# Patient Record
Sex: FEMALE | Race: WHITE | Employment: FULL TIME | URBAN - METROPOLITAN AREA
[De-identification: names, ages, dates, MRNs, and addresses within clinical notes are randomized per-mention and may not be internally consistent; named-entity substitution may affect disease eponyms.]

---

## 2020-04-14 LAB
HBSAG, EXTERNAL: NEGATIVE
HIV, EXTERNAL: NON REACTIVE
RUBELLA, EXTERNAL: NORMAL
TYPE, ABO & RH, EXTERNAL: NORMAL

## 2020-10-16 LAB — GRBS, EXTERNAL: POSITIVE

## 2020-11-14 LAB — T. PALLIDUM, EXTERNAL: NON REACTIVE

## 2020-11-17 ENCOUNTER — TRANSCRIBE ORDER (OUTPATIENT)
Dept: REGISTRATION | Age: 35
End: 2020-11-17

## 2020-11-17 ENCOUNTER — HOSPITAL ENCOUNTER (OUTPATIENT)
Dept: PREADMISSION TESTING | Age: 35
Discharge: HOME | End: 2020-11-17
Payer: COMMERCIAL

## 2020-11-17 DIAGNOSIS — Z01.812 PRE-PROCEDURE LAB EXAM: Primary | ICD-10-CM

## 2020-11-17 DIAGNOSIS — Z01.812 PRE-PROCEDURE LAB EXAM: ICD-10-CM

## 2020-11-17 PROCEDURE — 87635 SARS-COV-2 COVID-19 AMP PRB: CPT

## 2020-11-18 LAB — SARS-COV-2, COV2NT: NOT DETECTED

## 2020-11-19 ENCOUNTER — HOSPITAL ENCOUNTER (INPATIENT)
Age: 35
LOS: 3 days | Discharge: HOME | End: 2020-11-22
Attending: OBSTETRICS & GYNECOLOGY | Admitting: OBSTETRICS & GYNECOLOGY
Payer: COMMERCIAL

## 2020-11-19 DIAGNOSIS — Z98.891 STATUS POST CESAREAN DELIVERY: Primary | ICD-10-CM

## 2020-11-19 PROBLEM — Z3A.40 40 WEEKS GESTATION OF PREGNANCY: Status: ACTIVE | Noted: 2020-11-19

## 2020-11-19 LAB
ERYTHROCYTE [DISTWIDTH] IN BLOOD BY AUTOMATED COUNT: 12.3 % (ref 11.5–14.5)
HCT VFR BLD AUTO: 38.2 % (ref 35–47)
HGB BLD-MCNC: 13.4 G/DL (ref 11.5–16)
MCH RBC QN AUTO: 32.9 PG (ref 26–34)
MCHC RBC AUTO-ENTMCNC: 35.1 G/DL (ref 30–36.5)
MCV RBC AUTO: 93.9 FL (ref 80–99)
NRBC # BLD: 0 K/UL (ref 0–0.01)
NRBC BLD-RTO: 0 PER 100 WBC
PLATELET # BLD AUTO: 215 K/UL (ref 150–400)
PMV BLD AUTO: 11.5 FL (ref 8.9–12.9)
RBC # BLD AUTO: 4.07 M/UL (ref 3.8–5.2)
WBC # BLD AUTO: 7.7 K/UL (ref 3.6–11)

## 2020-11-19 PROCEDURE — 85027 COMPLETE CBC AUTOMATED: CPT

## 2020-11-19 PROCEDURE — 65270000029 HC RM PRIVATE

## 2020-11-19 PROCEDURE — 74011250637 HC RX REV CODE- 250/637: Performed by: OBSTETRICS & GYNECOLOGY

## 2020-11-19 PROCEDURE — 75410000002 HC LABOR FEE PER 1 HR

## 2020-11-19 PROCEDURE — 36415 COLL VENOUS BLD VENIPUNCTURE: CPT

## 2020-11-19 PROCEDURE — 74011250636 HC RX REV CODE- 250/636: Performed by: OBSTETRICS & GYNECOLOGY

## 2020-11-19 PROCEDURE — 3E0P7VZ INTRODUCTION OF HORMONE INTO FEMALE REPRODUCTIVE, VIA NATURAL OR ARTIFICIAL OPENING: ICD-10-PCS | Performed by: OBSTETRICS & GYNECOLOGY

## 2020-11-19 PROCEDURE — 74011000258 HC RX REV CODE- 258: Performed by: OBSTETRICS & GYNECOLOGY

## 2020-11-19 RX ORDER — FENTANYL CITRATE 50 UG/ML
100 INJECTION, SOLUTION INTRAMUSCULAR; INTRAVENOUS
Status: DISCONTINUED | OUTPATIENT
Start: 2020-11-19 | End: 2020-11-20

## 2020-11-19 RX ORDER — SODIUM CHLORIDE 0.9 % (FLUSH) 0.9 %
5-40 SYRINGE (ML) INJECTION AS NEEDED
Status: DISCONTINUED | OUTPATIENT
Start: 2020-11-19 | End: 2020-11-20

## 2020-11-19 RX ORDER — ACETAMINOPHEN 325 MG/1
650 TABLET ORAL
Status: DISCONTINUED | OUTPATIENT
Start: 2020-11-19 | End: 2020-11-20

## 2020-11-19 RX ORDER — ZOLPIDEM TARTRATE 5 MG/1
5 TABLET ORAL
Status: DISCONTINUED | OUTPATIENT
Start: 2020-11-19 | End: 2020-11-20

## 2020-11-19 RX ORDER — BUTORPHANOL TARTRATE 1 MG/ML
1 INJECTION INTRAMUSCULAR; INTRAVENOUS
Status: DISCONTINUED | OUTPATIENT
Start: 2020-11-19 | End: 2020-11-20

## 2020-11-19 RX ORDER — SODIUM CHLORIDE 0.9 % (FLUSH) 0.9 %
5-40 SYRINGE (ML) INJECTION EVERY 8 HOURS
Status: DISCONTINUED | OUTPATIENT
Start: 2020-11-19 | End: 2020-11-20

## 2020-11-19 RX ADMIN — AMPICILLIN SODIUM 2 G: 2 INJECTION, POWDER, FOR SOLUTION INTRAMUSCULAR; INTRAVENOUS at 22:18

## 2020-11-19 RX ADMIN — ZOLPIDEM TARTRATE 5 MG: 5 TABLET ORAL at 23:02

## 2020-11-19 NOTE — PROGRESS NOTES
Asked by Dr. Daphne Delgado to place a [place] balloon for this G1 induction @ 40 2/7 wks. Patient is doing well with no complaints this evening. She reported having no questions for me having reviewed the process with Dr. Daphne Delgado in the office. BSUS VTX  FHT baseline 140, moderate variability, accels present, decels absent  TOCO q 3-6 minutes irregularly  Cx 2/90/-1  Cook placed with cervical exam 60/40; tolerated well by patient. Dr. Daphne Delgado to inport H&P and give RN instructions regarding timing of pitocin or cytotec.

## 2020-11-19 NOTE — PROGRESS NOTES
1600-Pt arrived to labor and delivery room 6 for induction of labor. [facility] 11/17/20. Pt denies any complications during pregnancy. Gown given. 1748-Dr Sandra Bhatti at bedside. Bedside ultrasound done and confirmed vertex presentation. Cervical ripening balloon placed without difficulties. 60ml in uterine balloon and 40 ml in vaginal balloon. Pt tolerated procedure well. Dr David Gongora to call and give orders regarding cytotec or pitocin. 1935-Verbal shift change report given to BIPIN Becerra RN (oncoming nurse) by SRINIVAS Sigala RN  (offgoing nurse). Report included the following information SBAR.

## 2020-11-20 ENCOUNTER — ANESTHESIA EVENT (OUTPATIENT)
Dept: LABOR AND DELIVERY | Age: 35
End: 2020-11-20
Payer: COMMERCIAL

## 2020-11-20 ENCOUNTER — ANESTHESIA (OUTPATIENT)
Dept: LABOR AND DELIVERY | Age: 35
End: 2020-11-20
Payer: COMMERCIAL

## 2020-11-20 PROCEDURE — 65410000002 HC RM PRIVATE OB

## 2020-11-20 PROCEDURE — 77030014125 HC TY EPDRL BBMI -B: Performed by: ANESTHESIOLOGY

## 2020-11-20 PROCEDURE — 77030019905 HC CATH URETH INTMIT MDII -A

## 2020-11-20 PROCEDURE — 74011250636 HC RX REV CODE- 250/636: Performed by: OBSTETRICS & GYNECOLOGY

## 2020-11-20 PROCEDURE — 76010000391 HC C SECN FIRST 1 HR: Performed by: OBSTETRICS & GYNECOLOGY

## 2020-11-20 PROCEDURE — 74011250636 HC RX REV CODE- 250/636: Performed by: NURSE ANESTHETIST, CERTIFIED REGISTERED

## 2020-11-20 PROCEDURE — 76060000078 HC EPIDURAL ANESTHESIA

## 2020-11-20 PROCEDURE — 74011250636 HC RX REV CODE- 250/636: Performed by: ANESTHESIOLOGY

## 2020-11-20 PROCEDURE — 77030040830 HC CATH URETH FOL MDII -A

## 2020-11-20 PROCEDURE — 74011000250 HC RX REV CODE- 250: Performed by: NURSE PRACTITIONER

## 2020-11-20 PROCEDURE — 77030012890

## 2020-11-20 PROCEDURE — 75410000003 HC RECOV DEL/VAG/CSECN EA 0.5 HR: Performed by: OBSTETRICS & GYNECOLOGY

## 2020-11-20 PROCEDURE — 74011000250 HC RX REV CODE- 250: Performed by: NURSE ANESTHETIST, CERTIFIED REGISTERED

## 2020-11-20 PROCEDURE — 74011000250 HC RX REV CODE- 250: Performed by: ANESTHESIOLOGY

## 2020-11-20 PROCEDURE — 74011250636 HC RX REV CODE- 250/636: Performed by: NURSE PRACTITIONER

## 2020-11-20 PROCEDURE — 74011000258 HC RX REV CODE- 258: Performed by: OBSTETRICS & GYNECOLOGY

## 2020-11-20 PROCEDURE — 76060000078 HC EPIDURAL ANESTHESIA: Performed by: OBSTETRICS & GYNECOLOGY

## 2020-11-20 PROCEDURE — A4300 CATH IMPL VASC ACCESS PORTAL: HCPCS

## 2020-11-20 PROCEDURE — 75410000002 HC LABOR FEE PER 1 HR

## 2020-11-20 RX ORDER — CEFAZOLIN SODIUM 1 G/3ML
INJECTION, POWDER, FOR SOLUTION INTRAMUSCULAR; INTRAVENOUS AS NEEDED
Status: DISCONTINUED | OUTPATIENT
Start: 2020-11-20 | End: 2020-11-20 | Stop reason: HOSPADM

## 2020-11-20 RX ORDER — OXYCODONE AND ACETAMINOPHEN 5; 325 MG/1; MG/1
1 TABLET ORAL
Status: DISCONTINUED | OUTPATIENT
Start: 2020-11-20 | End: 2020-11-22 | Stop reason: HOSPADM

## 2020-11-20 RX ORDER — SODIUM CHLORIDE, SODIUM LACTATE, POTASSIUM CHLORIDE, CALCIUM CHLORIDE 600; 310; 30; 20 MG/100ML; MG/100ML; MG/100ML; MG/100ML
125 INJECTION, SOLUTION INTRAVENOUS CONTINUOUS
Status: DISCONTINUED | OUTPATIENT
Start: 2020-11-20 | End: 2020-11-22 | Stop reason: HOSPADM

## 2020-11-20 RX ORDER — SODIUM CHLORIDE 0.9 % (FLUSH) 0.9 %
5-40 SYRINGE (ML) INJECTION EVERY 8 HOURS
Status: DISCONTINUED | OUTPATIENT
Start: 2020-11-20 | End: 2020-11-22 | Stop reason: HOSPADM

## 2020-11-20 RX ORDER — ONDANSETRON 2 MG/ML
INJECTION INTRAMUSCULAR; INTRAVENOUS AS NEEDED
Status: DISCONTINUED | OUTPATIENT
Start: 2020-11-20 | End: 2020-11-20 | Stop reason: HOSPADM

## 2020-11-20 RX ORDER — ONDANSETRON 2 MG/ML
4 INJECTION INTRAMUSCULAR; INTRAVENOUS
Status: DISCONTINUED | OUTPATIENT
Start: 2020-11-20 | End: 2020-11-20 | Stop reason: SDUPTHER

## 2020-11-20 RX ORDER — LIDOCAINE HYDROCHLORIDE AND EPINEPHRINE 20; 5 MG/ML; UG/ML
INJECTION, SOLUTION EPIDURAL; INFILTRATION; INTRACAUDAL; PERINEURAL
Status: COMPLETED
Start: 2020-11-20 | End: 2020-11-20

## 2020-11-20 RX ORDER — FENTANYL CITRATE 50 UG/ML
100 INJECTION, SOLUTION INTRAMUSCULAR; INTRAVENOUS ONCE
Status: DISCONTINUED | OUTPATIENT
Start: 2020-11-20 | End: 2020-11-20 | Stop reason: HOSPADM

## 2020-11-20 RX ORDER — OXYTOCIN/RINGER'S LACTATE 30/500 ML
1-25 PLASTIC BAG, INJECTION (ML) INTRAVENOUS
Status: DISCONTINUED | OUTPATIENT
Start: 2020-11-20 | End: 2020-11-20 | Stop reason: HOSPADM

## 2020-11-20 RX ORDER — NALOXONE HYDROCHLORIDE 0.4 MG/ML
0.4 INJECTION, SOLUTION INTRAMUSCULAR; INTRAVENOUS; SUBCUTANEOUS AS NEEDED
Status: DISCONTINUED | OUTPATIENT
Start: 2020-11-20 | End: 2020-11-22 | Stop reason: HOSPADM

## 2020-11-20 RX ORDER — IBUPROFEN 400 MG/1
800 TABLET ORAL EVERY 8 HOURS
Status: DISCONTINUED | OUTPATIENT
Start: 2020-11-20 | End: 2020-11-22 | Stop reason: HOSPADM

## 2020-11-20 RX ORDER — SODIUM CHLORIDE, SODIUM LACTATE, POTASSIUM CHLORIDE, CALCIUM CHLORIDE 600; 310; 30; 20 MG/100ML; MG/100ML; MG/100ML; MG/100ML
INJECTION, SOLUTION INTRAVENOUS
Status: DISCONTINUED | OUTPATIENT
Start: 2020-11-20 | End: 2020-11-20 | Stop reason: HOSPADM

## 2020-11-20 RX ORDER — FENTANYL CITRATE 50 UG/ML
INJECTION, SOLUTION INTRAMUSCULAR; INTRAVENOUS
Status: DISCONTINUED
Start: 2020-11-20 | End: 2020-11-20

## 2020-11-20 RX ORDER — KETOROLAC TROMETHAMINE 30 MG/ML
30 INJECTION, SOLUTION INTRAMUSCULAR; INTRAVENOUS
Status: DISPENSED | OUTPATIENT
Start: 2020-11-20 | End: 2020-11-21

## 2020-11-20 RX ORDER — DIPHENHYDRAMINE HYDROCHLORIDE 50 MG/ML
12.5 INJECTION, SOLUTION INTRAMUSCULAR; INTRAVENOUS
Status: DISCONTINUED | OUTPATIENT
Start: 2020-11-20 | End: 2020-11-20 | Stop reason: HOSPADM

## 2020-11-20 RX ORDER — OXYCODONE AND ACETAMINOPHEN 5; 325 MG/1; MG/1
2 TABLET ORAL
Status: DISCONTINUED | OUTPATIENT
Start: 2020-11-20 | End: 2020-11-22 | Stop reason: HOSPADM

## 2020-11-20 RX ORDER — DOCUSATE SODIUM 100 MG/1
100 CAPSULE, LIQUID FILLED ORAL
Status: DISCONTINUED | OUTPATIENT
Start: 2020-11-20 | End: 2020-11-22 | Stop reason: HOSPADM

## 2020-11-20 RX ORDER — OXYTOCIN/RINGER'S LACTATE 30/500 ML
10 PLASTIC BAG, INJECTION (ML) INTRAVENOUS AS NEEDED
Status: DISCONTINUED | OUTPATIENT
Start: 2020-11-20 | End: 2020-11-22 | Stop reason: HOSPADM

## 2020-11-20 RX ORDER — BUPIVACAINE HYDROCHLORIDE 2.5 MG/ML
INJECTION, SOLUTION EPIDURAL; INFILTRATION; INTRACAUDAL
Status: DISCONTINUED
Start: 2020-11-20 | End: 2020-11-20

## 2020-11-20 RX ORDER — DEXMEDETOMIDINE HYDROCHLORIDE 100 UG/ML
INJECTION, SOLUTION INTRAVENOUS AS NEEDED
Status: DISCONTINUED | OUTPATIENT
Start: 2020-11-20 | End: 2020-11-20 | Stop reason: HOSPADM

## 2020-11-20 RX ORDER — OXYTOCIN/RINGER'S LACTATE 30/500 ML
10 PLASTIC BAG, INJECTION (ML) INTRAVENOUS AS NEEDED
Status: DISCONTINUED | OUTPATIENT
Start: 2020-11-20 | End: 2020-11-20 | Stop reason: HOSPADM

## 2020-11-20 RX ORDER — BUPIVACAINE HYDROCHLORIDE 2.5 MG/ML
10 INJECTION, SOLUTION EPIDURAL; INFILTRATION; INTRACAUDAL ONCE
Status: DISCONTINUED | OUTPATIENT
Start: 2020-11-20 | End: 2020-11-20

## 2020-11-20 RX ORDER — SIMETHICONE 80 MG
80 TABLET,CHEWABLE ORAL
Status: DISCONTINUED | OUTPATIENT
Start: 2020-11-20 | End: 2020-11-22 | Stop reason: HOSPADM

## 2020-11-20 RX ORDER — MORPHINE SULFATE 10 MG/ML
6 INJECTION, SOLUTION INTRAMUSCULAR; INTRAVENOUS
Status: ACTIVE | OUTPATIENT
Start: 2020-11-20 | End: 2020-11-21

## 2020-11-20 RX ORDER — OXYTOCIN/RINGER'S LACTATE 30/500 ML
0-20 PLASTIC BAG, INJECTION (ML) INTRAVENOUS
Status: DISCONTINUED | OUTPATIENT
Start: 2020-11-20 | End: 2020-11-20

## 2020-11-20 RX ORDER — OXYTOCIN/RINGER'S LACTATE 20/1000 ML
PLASTIC BAG, INJECTION (ML) INTRAVENOUS
Status: DISCONTINUED | OUTPATIENT
Start: 2020-11-20 | End: 2020-11-20 | Stop reason: HOSPADM

## 2020-11-20 RX ORDER — SODIUM CHLORIDE, SODIUM LACTATE, POTASSIUM CHLORIDE, CALCIUM CHLORIDE 600; 310; 30; 20 MG/100ML; MG/100ML; MG/100ML; MG/100ML
125 INJECTION, SOLUTION INTRAVENOUS CONTINUOUS
Status: DISCONTINUED | OUTPATIENT
Start: 2020-11-20 | End: 2020-11-20 | Stop reason: HOSPADM

## 2020-11-20 RX ORDER — AMMONIA 15 % (W/V)
1 AMPUL (EA) INHALATION AS NEEDED
Status: DISCONTINUED | OUTPATIENT
Start: 2020-11-20 | End: 2020-11-22 | Stop reason: HOSPADM

## 2020-11-20 RX ORDER — NALOXONE HYDROCHLORIDE 0.4 MG/ML
0.4 INJECTION, SOLUTION INTRAMUSCULAR; INTRAVENOUS; SUBCUTANEOUS AS NEEDED
Status: DISCONTINUED | OUTPATIENT
Start: 2020-11-20 | End: 2020-11-20 | Stop reason: HOSPADM

## 2020-11-20 RX ORDER — FENTANYL/BUPIVACAINE/NS/PF 2-1250MCG
1-16 PREFILLED PUMP RESERVOIR EPIDURAL CONTINUOUS
Status: DISCONTINUED | OUTPATIENT
Start: 2020-11-20 | End: 2020-11-20

## 2020-11-20 RX ORDER — MORPHINE SULFATE 10 MG/ML
10 INJECTION, SOLUTION INTRAMUSCULAR; INTRAVENOUS
Status: ACTIVE | OUTPATIENT
Start: 2020-11-20 | End: 2020-11-21

## 2020-11-20 RX ORDER — ACETAMINOPHEN 325 MG/1
650 TABLET ORAL
Status: DISCONTINUED | OUTPATIENT
Start: 2020-11-20 | End: 2020-11-22 | Stop reason: HOSPADM

## 2020-11-20 RX ORDER — MORPHINE SULFATE 0.5 MG/ML
INJECTION, SOLUTION EPIDURAL; INTRATHECAL; INTRAVENOUS AS NEEDED
Status: DISCONTINUED | OUTPATIENT
Start: 2020-11-20 | End: 2020-11-20 | Stop reason: HOSPADM

## 2020-11-20 RX ORDER — DIPHENHYDRAMINE HYDROCHLORIDE 50 MG/ML
12.5 INJECTION, SOLUTION INTRAMUSCULAR; INTRAVENOUS
Status: DISCONTINUED | OUTPATIENT
Start: 2020-11-20 | End: 2020-11-22 | Stop reason: HOSPADM

## 2020-11-20 RX ORDER — OXYTOCIN/RINGER'S LACTATE 30/500 ML
95 PLASTIC BAG, INJECTION (ML) INTRAVENOUS AS NEEDED
Status: DISCONTINUED | OUTPATIENT
Start: 2020-11-20 | End: 2020-11-22 | Stop reason: HOSPADM

## 2020-11-20 RX ORDER — OXYTOCIN/RINGER'S LACTATE 30/500 ML
95 PLASTIC BAG, INJECTION (ML) INTRAVENOUS AS NEEDED
Status: DISCONTINUED | OUTPATIENT
Start: 2020-11-20 | End: 2020-11-20 | Stop reason: HOSPADM

## 2020-11-20 RX ORDER — CEFAZOLIN SODIUM/WATER 2 G/20 ML
SYRINGE (ML) INTRAVENOUS
Status: DISCONTINUED
Start: 2020-11-20 | End: 2020-11-20

## 2020-11-20 RX ORDER — FENTANYL CITRATE 50 UG/ML
INJECTION, SOLUTION INTRAMUSCULAR; INTRAVENOUS AS NEEDED
Status: DISCONTINUED | OUTPATIENT
Start: 2020-11-20 | End: 2020-11-20 | Stop reason: HOSPADM

## 2020-11-20 RX ORDER — CEFAZOLIN SODIUM/WATER 2 G/20 ML
2 SYRINGE (ML) INTRAVENOUS ONCE
Status: DISCONTINUED | OUTPATIENT
Start: 2020-11-20 | End: 2020-11-20 | Stop reason: HOSPADM

## 2020-11-20 RX ORDER — BUPIVACAINE HYDROCHLORIDE 2.5 MG/ML
INJECTION, SOLUTION EPIDURAL; INFILTRATION; INTRACAUDAL AS NEEDED
Status: DISCONTINUED | OUTPATIENT
Start: 2020-11-20 | End: 2020-11-20 | Stop reason: HOSPADM

## 2020-11-20 RX ORDER — HYDROCORTISONE 1 %
CREAM (GRAM) TOPICAL AS NEEDED
Status: DISCONTINUED | OUTPATIENT
Start: 2020-11-20 | End: 2020-11-22 | Stop reason: HOSPADM

## 2020-11-20 RX ORDER — ONDANSETRON 2 MG/ML
4 INJECTION INTRAMUSCULAR; INTRAVENOUS
Status: DISCONTINUED | OUTPATIENT
Start: 2020-11-20 | End: 2020-11-22 | Stop reason: HOSPADM

## 2020-11-20 RX ORDER — EPHEDRINE SULFATE/0.9% NACL/PF 50 MG/5 ML
10 SYRINGE (ML) INTRAVENOUS
Status: DISCONTINUED | OUTPATIENT
Start: 2020-11-20 | End: 2020-11-20 | Stop reason: HOSPADM

## 2020-11-20 RX ORDER — LIDOCAINE HYDROCHLORIDE AND EPINEPHRINE 20; 5 MG/ML; UG/ML
INJECTION, SOLUTION EPIDURAL; INFILTRATION; INTRACAUDAL; PERINEURAL AS NEEDED
Status: DISCONTINUED | OUTPATIENT
Start: 2020-11-20 | End: 2020-11-20 | Stop reason: HOSPADM

## 2020-11-20 RX ORDER — SODIUM CHLORIDE 0.9 % (FLUSH) 0.9 %
5-40 SYRINGE (ML) INJECTION AS NEEDED
Status: DISCONTINUED | OUTPATIENT
Start: 2020-11-20 | End: 2020-11-22 | Stop reason: HOSPADM

## 2020-11-20 RX ADMIN — MORPHINE SULFATE 2.5 MG: 0.5 INJECTION, SOLUTION EPIDURAL; INTRATHECAL; INTRAVENOUS at 18:40

## 2020-11-20 RX ADMIN — CEFAZOLIN 2 G: 330 INJECTION, POWDER, FOR SOLUTION INTRAMUSCULAR; INTRAVENOUS at 17:55

## 2020-11-20 RX ADMIN — DEXMEDETOMIDINE HYDROCHLORIDE 4 MCG: 100 INJECTION, SOLUTION, CONCENTRATE INTRAVENOUS at 18:39

## 2020-11-20 RX ADMIN — MORPHINE SULFATE 2.5 MG: 0.5 INJECTION, SOLUTION EPIDURAL; INTRATHECAL; INTRAVENOUS at 18:17

## 2020-11-20 RX ADMIN — FENTANYL CITRATE 100 MCG: 50 INJECTION, SOLUTION INTRAMUSCULAR; INTRAVENOUS at 10:02

## 2020-11-20 RX ADMIN — AMPICILLIN SODIUM 1 G: 1 INJECTION, POWDER, FOR SOLUTION INTRAMUSCULAR; INTRAVENOUS at 10:00

## 2020-11-20 RX ADMIN — PHENYLEPHRINE HYDROCHLORIDE 20 MCG/MIN: 10 INJECTION INTRAVENOUS at 17:53

## 2020-11-20 RX ADMIN — SODIUM CHLORIDE, SODIUM LACTATE, POTASSIUM CHLORIDE, AND CALCIUM CHLORIDE 125 ML/HR: 600; 310; 30; 20 INJECTION, SOLUTION INTRAVENOUS at 08:30

## 2020-11-20 RX ADMIN — DEXMEDETOMIDINE HYDROCHLORIDE 4 MCG: 100 INJECTION, SOLUTION, CONCENTRATE INTRAVENOUS at 18:45

## 2020-11-20 RX ADMIN — DEXMEDETOMIDINE HYDROCHLORIDE 4 MCG: 100 INJECTION, SOLUTION, CONCENTRATE INTRAVENOUS at 18:41

## 2020-11-20 RX ADMIN — BUPIVACAINE HYDROCHLORIDE 10 ML: 2.5 INJECTION, SOLUTION EPIDURAL; INFILTRATION; INTRACAUDAL; PERINEURAL at 10:02

## 2020-11-20 RX ADMIN — AMPICILLIN SODIUM 1 G: 1 INJECTION, POWDER, FOR SOLUTION INTRAMUSCULAR; INTRAVENOUS at 14:00

## 2020-11-20 RX ADMIN — Medication 999 ML/HR: at 18:11

## 2020-11-20 RX ADMIN — LIDOCAINE HYDROCHLORIDE AND EPINEPHRINE 10 ML: 20; 5 INJECTION, SOLUTION EPIDURAL; INFILTRATION; INTRACAUDAL; PERINEURAL at 17:42

## 2020-11-20 RX ADMIN — ONDANSETRON HYDROCHLORIDE 4 MG: 2 INJECTION, SOLUTION INTRAMUSCULAR; INTRAVENOUS at 17:53

## 2020-11-20 RX ADMIN — KETOROLAC TROMETHAMINE 30 MG: 30 INJECTION, SOLUTION INTRAMUSCULAR at 20:50

## 2020-11-20 RX ADMIN — AZITHROMYCIN MONOHYDRATE 250 MG: 500 INJECTION, POWDER, LYOPHILIZED, FOR SOLUTION INTRAVENOUS at 18:18

## 2020-11-20 RX ADMIN — Medication 10 ML: at 06:00

## 2020-11-20 RX ADMIN — SODIUM CHLORIDE, SODIUM LACTATE, POTASSIUM CHLORIDE, AND CALCIUM CHLORIDE 125 ML/HR: 600; 310; 30; 20 INJECTION, SOLUTION INTRAVENOUS at 23:17

## 2020-11-20 RX ADMIN — SODIUM CHLORIDE, POTASSIUM CHLORIDE, SODIUM LACTATE AND CALCIUM CHLORIDE: 600; 310; 30; 20 INJECTION, SOLUTION INTRAVENOUS at 17:51

## 2020-11-20 RX ADMIN — AMPICILLIN SODIUM 1 G: 1 INJECTION, POWDER, FOR SOLUTION INTRAMUSCULAR; INTRAVENOUS at 06:00

## 2020-11-20 RX ADMIN — PHENYLEPHRINE HYDROCHLORIDE 80 MCG: 10 INJECTION INTRAVENOUS at 17:54

## 2020-11-20 RX ADMIN — ONDANSETRON HYDROCHLORIDE 4 MG: 2 INJECTION, SOLUTION INTRAMUSCULAR; INTRAVENOUS at 18:37

## 2020-11-20 RX ADMIN — Medication 10 ML/HR: at 10:19

## 2020-11-20 RX ADMIN — OXYTOCIN 2 MILLI-UNITS/MIN: 10 INJECTION INTRAVENOUS at 08:30

## 2020-11-20 RX ADMIN — AMPICILLIN SODIUM 1 G: 1 INJECTION, POWDER, FOR SOLUTION INTRAMUSCULAR; INTRAVENOUS at 02:04

## 2020-11-20 NOTE — H&P
History & Physical    Name: Esperanza Thorne MRN: [id number]  SSN: [id number]    YOB: 1985  Age: 37 y.o. Sex: female        Subjective:     Estimated Date of Delivery: 20  OB History    Para Term  AB Living   1             SAB TAB Ectopic Molar Multiple Live Births                    # Outcome Date GA Lbr Jordi/2nd Weight Sex Delivery Anes PTL Lv   1 Current                Ms. Felicitas Waite is admitted with pregnancy at 40w2d for induction of labor. Prenatal course was complicated by GBS positive, rh negative s/p rhogam at 28 weeks, CPC which resolved. Please see prenatal records for details. History reviewed. No pertinent past medical history. Past Surgical History:   Procedure Laterality Date    HX OTHER SURGICAL      knee surgery      Social History     Occupational History    Not on file   Tobacco Use    Smoking status: Never Smoker   Substance and Sexual Activity    Alcohol use: Not Currently     Frequency: Never    Drug use: Never    Sexual activity: Not on file     Family History   Problem Relation Age of Onset    Diabetes Father     Cancer Sister        No Known Allergies  Prior to Admission medications    Medication Sig Start Date End Date Taking? Authorizing Provider   PNV QP.26/ECYKBII fum/folic ac (PRENATAL PO) Take 1 Tab by mouth. Yes Provider, Historical        Review of Systems: Pertinent items are noted in HPI.     Objective:     Vitals:  Vitals:    20 1610 20 1621 20 1627   BP:  (!) 129/90 117/81   Pulse:  99 91   Resp:  14    Temp:  98.9 °F (37.2 °C)    Height: 5' 7\" (1.702 m)          Physical Exam:  Cervical exam per Dr Elvin Marcos 90/2/-1  Membranes:  Intact  Fetal Heart Rate: Baseline: 120s per minute  Variability: moderate  Accelerations: yes  Decelerations: none  Uterine contractions: none    Prenatal Labs:   Lab Results   Component Value Date/Time    Rubella, External immune  2020    GrBStrep, External positive  10/16/2020    HBsAg, External negative  04/14/2020    HIV, External non reactive  04/14/2020    ABO,Rh O negative  04/14/2020         Assessment/Plan:     Active Problems:    40 weeks gestation of pregnancy (11/19/2020)         Plan: Admit for elective induction of labor. Category I tracing. [facility] catheter placed by Dr Bill Guillermo (please see her note for details). Plan to add cytotec 25mcg q4h buccal. Re-eval for pitocin in am. .  Group B Strep was positive, will treat prophylactically with ampicillin.

## 2020-11-20 NOTE — BRIEF OP NOTE
Brief Postoperative Note    Patient: Orlando Ladbob  YOB: 1985  MRN: [id number]    Date of Procedure: 2020     Pre-Op Diagnosis: 40 weeks, second stage arrest, Failed Vacuum    Post-Op Diagnosis: Same as preoperative diagnosis. Procedure(s):   SECTION-Primary [facility]    Surgeon(s):  DO Sukhjinder Sharpe Isla Pinion, MD    Surgical Assistant: None    Anesthesia: Epidural     Estimated Blood Loss (mL): 887    Complications: None    Specimens:   ID Type Source Tests Collected by Time Destination   1 : Placenta Placenta   Manjeet Holm DO 2020 1814 Discarded        Implants: * No implants in log *    Drains: * No LDAs found *    Findings: LBFI in ROP. Nuchal cord x 1. Apgars 8,9. Weight pending. Normal maternal anatomy.      Electronically Signed by Amy Barbour DO on 2020 at 6:40 PM

## 2020-11-20 NOTE — PROGRESS NOTES
1540 Bedside and Verbal shift change report given to BAKARI artis RN (oncoming nurse) by Jerrod Zimmerman RN (offgoing nurse). Report included the following information SBAR, Kardex, Intake/Output and Recent Results. Pt continues to push during right pelvic release position. 1550 To semifolwers to push. Variable decels with slow recovery present. [address] Melton Sep in pushing with and evaluating patient. Aware of fetal decels with pushing  1725 Up in [facility]. Preparing for vacuum delivery. NICU callled to attend delivery  [address] [facility] Dr Izquierdo Speaks attempted vacuum delivery over three contractions. Three pulls and two pop offs. Unsuccessful. Plan for  section. Anesthesia called. NICU will return when ready in OR   1735 Small vaginal lacetation repair. Pitocin off. O2 on via face mask   Returned to LDR # 5 for recovery post  section  1930 Bedside and Verbal shift change report given to Kate Clark RN (oncoming nurse) by Magi Aguilar RN (offgoing nurse). Report included the following information SBAR, Kardex, MAR and Recent Results. Tien Jimenez

## 2020-11-20 NOTE — PROGRESS NOTES
Patient doing well with contractions but is starting to feel some intermittent pressure. FHTs baseline 125, mod variability, accels present, decels absent (intermittent monitoring)  TOCO q 3 minutes  Cx 6/90/-1/forebag ruptured, clear    Continue current expectant management. Patient ultimately plans an epidural.  Amp for GBS.

## 2020-11-20 NOTE — PROGRESS NOTES
Labor Progress Note  Patient seen, fetal heart rate and contraction pattern evaluated, patient examined. Pt has been pushing for close to 3 hours with minimal further descent than +2. Caput present. Discussed option for trial of vacuum with pt and . Discussed r/b. Pt in agreement to trial. Bladder drained with minimal urine obtained. Kiwi vaccuum applied properly and ensure proper position without an vaginal tissue. Vaccuum increased to green with contraction and with pt pushing, gentle traction applied. After 3 applications and 2 popoffs, vaccuum discontinued and pt advised of recommendation for  delivery. Pt in agreement to proceed. Assessment/Plan:  Second stage arrest s/p unsuccessful operative delivery. Proceed with  delivery. Category II tracing.

## 2020-11-20 NOTE — PROGRESS NOTES
Patricia balloon came out when pt up to RR. Repeat cervical exam 4-5/90/-1. Cytotec never started as patient has been kalen on her own. If contractions space, will start pitocin. Will start antibiotics for GBS now.

## 2020-11-20 NOTE — ANESTHESIA POSTPROCEDURE EVALUATION
Procedure(s):   SECTION. epidural    Anesthesia Post Evaluation        Patient location during evaluation: PACU  Patient participation: complete - patient participated  Level of consciousness: awake  Pain management: adequate  Airway patency: patent  Anesthetic complications: no  Cardiovascular status: hemodynamically stable  Respiratory status: acceptable  Hydration status: acceptable  Comments: I have seen and evaluated the patient. The patient is ready for PACU discharge. Emily Ramires, DO                         INITIAL Post-op Vital signs:   Vitals Value Taken Time   /72 2020  6:57 PM   Temp     Pulse 111 2020  6:57 PM   Resp 20 2020  6:54 PM   SpO2 98 % 2020  6:54 PM   Vitals shown include unvalidated device data.

## 2020-11-20 NOTE — PROGRESS NOTES
0600 Report from BIPIN Hauser RN and care assumed. Pt coping well with UCs. Discussed epidural. Pt states having back pain. Position changed encouraged. Knee chest for 20 minutes  0630 Up to amb in room, using birthing ball  0700 Side lying pelvic release on left  0730 Side lying pelvic release on right  0747 Dr. Xuan Logan at bedside SVE  0830 Pitocin init, pt up on birthing ball  0955 Dr. Kristie Childs at bedside for epidural  1006 To left side  1023 To right side and peanut ball placed  1115 Turned to left for treatment of deepening variable decels.  St cath and SVE, suspect OP presentation, peanut ball and [place]  1200 out of [place]  1215 To right side [place] with peanut ball  1315 Variables more consistent, Position change, left side peanut ball and SVE, 9/100/+1  1345 Pitocin off and turned on right side  1415 [place] for 3 UCs  1430 St cath  1435 C/C/+1 pushing in semifowlers  1450 Pushing in knee chest  1520 Pushing in left sidelying pelvic release  1530 Pushing in right side lying pelvic release  1545 Pushing in Semi fowlers, report to [employer],

## 2020-11-20 NOTE — PROGRESS NOTES
1935: Bedside shift change report given to BIPIN Becerra RN (oncoming nurse) by Artemio Whalen RN (offgoing nurse). Report included the following information SBAR.   2140: pt reports hough bulb fell out while in the bathroom. 2202: SVE by Dr. Matthieu Friedman [birth year]  [phone]: pt transferred to room 5 for portable monitoring capabilities   0235: SROM, small amount of clear fluid noted   0511: SVE by Dr. Matthieu Friedman 6/C/-1, AROM of forebag, large amount of clear fluid   0535: Bedside shift change report given to VÍCTOR Schwartz RN (oncoming nurse) by Hemalatha Cevallos RN (offgoing nurse). Report included the following information SBAR.

## 2020-11-20 NOTE — ANESTHESIA PROCEDURE NOTES
Epidural Block    Start time: 11/20/2020 10:07 AM  Performed by: Christoph Blackburn MD  Authorized by: Christoph Blackburn MD     Pre-Procedure  Indication: labor epidural    Preanesthetic Checklist: patient identified, risks and benefits discussed, anesthesia consent, site marked, patient being monitored, timeout performed and anesthesia consent    Timeout Time: 10:07        Epidural:   Patient position:  Seated  Prep region:  Lumbar  Prep: Patient draped and Chlorhexidine    Location:  L3-4    Needle and Epidural Catheter:   Needle Type:  Tuohy  Needle Gauge:  17 G  Injection Technique:  Loss of resistance using saline  Attempts:  1  Catheter Size:  18 G  Catheter at Skin Depth (cm):  12  Depth in Epidural Space (cm):  5  Events: no blood with aspiration, no cerebrospinal fluid with aspiration, no paresthesia and negative aspiration test    Test Dose:  Negative    Assessment:   Catheter Secured:  Tegaderm and tape  Insertion:  Uncomplicated  Patient tolerance:  Patient tolerated the procedure well with no immediate complications

## 2020-11-20 NOTE — ANESTHESIA PREPROCEDURE EVALUATION
Relevant Problems   No relevant active problems       Anesthetic History   No history of anesthetic complications            Review of Systems / Medical History  Patient summary reviewed, nursing notes reviewed and pertinent labs reviewed    Pulmonary  Within defined limits                 Neuro/Psych   Within defined limits           Cardiovascular  Within defined limits                Exercise tolerance: >4 METS     GI/Hepatic/Renal  Within defined limits              Endo/Other  Within defined limits           Other Findings              Physical Exam    Airway  Mallampati: II  TM Distance: 4 - 6 cm  Neck ROM: normal range of motion   Mouth opening: Normal     Cardiovascular    Rhythm: regular  Rate: normal         Dental  No notable dental hx       Pulmonary  Breath sounds clear to auscultation               Abdominal  Abdominal exam normal       Other Findings            Anesthetic Plan    ASA: 2  Anesthesia type: epidural            Anesthetic plan and risks discussed with: Patient and Family

## 2020-11-20 NOTE — PROGRESS NOTES
Labor Progress Note  Patient seen, fetal heart rate and contraction pattern evaluated, patient examined. Reports contractions more intense since last position change. No data found. Physical Exam:  Cervical Exam:  6 cm dilated    90% effaced    -1 station    Membranes:  clear  Uterine Activity: Frequency: Every 4-5 minutes per nursing as pt with intermittent monitoring currently  Fetal Heart Rate: Reactive per nursing as pt with intermittent monitoring currently.     Assessment/Plan:  Reassuring fetal status, Labor  Not progressing normally  start pitocin augmentation, Continue plan for vaginal delivery

## 2020-11-20 NOTE — OP NOTES
Section Delivery Operative Report     Date of Surgery: 2020     Preoperative Diagnosis: 40 weeks, second stage arrest, Failed Vacuum    Postoperative Diagnosis: Same but delivered by     Procedure: Procedure(s):   SECTION-Primary [facility]    Surgeon(s) and Role:     * Shahnaz Bean DO - Primary     * Rylie Sullivan MD - Assisting     Anesthesia: Epidural    Findings: LBFI in ROP. Apgars 8,9. Nuchal cord x 1. Normal maternal anatomy. Procedure Detail:    The patient was taken to the operating room, where epidural anesthesia was found to be adequate. The patient was prepped and draped in the normal sterile fashion. Pfannenstiel skin incision was made with the scalpel and carried down to the underlying fascia. The fascial incision was extended laterally with Benavidez scissors. This fascial incision was grasped with Kocher clamps, tented up, and the underlying rectus muscles were dissected sharply. The inferior edge of the rectus fascia was grasped with Kocher clamps, tented up, and the underlying rectus muscle was dissected off sharply. The rectus muscle was divided in the midline bluntly. The peritoneum was entered bluntly and extended inferiorly and superiorly with Metzenbaum scissors. The bladder blade was then inserted. The vesicouterine peritoneum was identified, grasped with pick-ups and entered sharply with Metzenbaum scissors. The bladder flap was then created digitally and the bladder blade was reinserted. A low transverse uterine incision was made with the scalpel and extended laterally with blunt finger dissection. The babys head was then delivered atraumatically. The nose and mouth were suctioned. The cord was clamped and cut in delayed fashion and the baby was handed off to the waiting  care unit staff. Placenta was then delivered spontaneously. The uterus was exteriorized and cleared of all clots and debris.  800mcg of cytotec was placed intrauterinely for uterine atony. The uterine incision was closed in two layers. The first layer with running locked layer of 0 Monocryl. The second layer was an imbricating layer of 0 Monocryl with good hemostasis assured. The pelvis was washed with warm normal saline and the uterus was returned to the abdomen. Good hemostasis was again reassured. The paracolic gutters were washed with warm normal saline and cleared of all clots and debris. Good hemostasis was again reassured throughout. Seprafilm  was placed over the uterine incision and the peritoneum was closed with 2-0 Vicryl in a running fashion. One interrupted mattress stitch of 2-0 Vicryl was used to reapproximate the rectus muscles. The fascia was closed with #1 Vicryl in a running fashion. Good hemostasis was assured. The subcuticular layers were reapproximated with 2-0 plain gut in interrupted fashion. The skin was closed with a 4-0 Monocryl in a subcuticular fashion. The patient tolerated the procedure well. Sponge, lap, and needle counts were correct times two and the patient was taken to recovery in stable condition.       Estimated Blood Loss:  500cc    Specimens:   ID Type Source Tests Collected by Time Destination   1 : Placenta Placenta   Veronica Zamora,  2020 1814 Discarded        Cord Blood Results:  Information for the patient's :  Marvin Snow [[id number]]   No results found for: PCTABR, PCTDIG, BILI, ABORH     No results found for: APH, APCO2, APO2, AHCO3, ABEC, ABDC, O2ST, SITE, RSCOM     Prenatal Labs:  Lab Results   Component Value Date/Time    HBsAg, External negative  2020    HIV, External non reactive  2020    Rubella, External immune  2020    GrBStrep, External positive  10/16/2020

## 2020-11-20 NOTE — PROGRESS NOTES
Labor Progress Note  Patient seen, fetal heart rate and contraction pattern evaluated, patient examined.  Pt comfortable with epidural.  Patient Vitals for the past 1 hrs:   BP Temp Pulse Resp SpO2   11/20/20 1159 (!) 100/57 98.2 °F (36.8 °C) 71 16 (!) 86 %       Physical Exam:  Cervical Exam:  8 cm dilated    90% effaced    0 station    Membranes:  clear with small amt of bloody show  Uterine Activity: Frequency: Every 2-4 minutes on 10 pit  Fetal Heart Rate: Baseline: 120s per minute  Variability: moderate  Accelerations: yes  Decelerations: variable    Assessment/Plan:  Reassuring fetal status, Labor  Progressing normally, Continue plan for vaginal delivery

## 2020-11-21 LAB
BASOPHILS # BLD: 0 K/UL (ref 0–0.1)
BASOPHILS NFR BLD: 0 % (ref 0–1)
DIFFERENTIAL METHOD BLD: ABNORMAL
EOSINOPHIL # BLD: 0 K/UL (ref 0–0.4)
EOSINOPHIL NFR BLD: 0 % (ref 0–7)
ERYTHROCYTE [DISTWIDTH] IN BLOOD BY AUTOMATED COUNT: 12.7 % (ref 11.5–14.5)
HCT VFR BLD AUTO: 28.3 % (ref 35–47)
HGB BLD-MCNC: 9.9 G/DL (ref 11.5–16)
IMM GRANULOCYTES # BLD AUTO: 0 K/UL (ref 0–0.04)
IMM GRANULOCYTES NFR BLD AUTO: 0 % (ref 0–0.5)
LYMPHOCYTES # BLD: 0.9 K/UL (ref 0.8–3.5)
LYMPHOCYTES NFR BLD: 8 % (ref 12–49)
MCH RBC QN AUTO: 33 PG (ref 26–34)
MCHC RBC AUTO-ENTMCNC: 35 G/DL (ref 30–36.5)
MCV RBC AUTO: 94.3 FL (ref 80–99)
MONOCYTES # BLD: 0.9 K/UL (ref 0–1)
MONOCYTES NFR BLD: 9 % (ref 5–13)
NEUTS SEG # BLD: 8.6 K/UL (ref 1.8–8)
NEUTS SEG NFR BLD: 82 % (ref 32–75)
NRBC # BLD: 0 K/UL (ref 0–0.01)
NRBC BLD-RTO: 0 PER 100 WBC
PLATELET # BLD AUTO: 156 K/UL (ref 150–400)
PMV BLD AUTO: 10.9 FL (ref 8.9–12.9)
RBC # BLD AUTO: 3 M/UL (ref 3.8–5.2)
WBC # BLD AUTO: 10.5 K/UL (ref 3.6–11)

## 2020-11-21 PROCEDURE — 85025 COMPLETE CBC W/AUTO DIFF WBC: CPT

## 2020-11-21 PROCEDURE — 77030019905 HC CATH URETH INTMIT MDII -A

## 2020-11-21 PROCEDURE — 86900 BLOOD TYPING SEROLOGIC ABO: CPT

## 2020-11-21 PROCEDURE — 36415 COLL VENOUS BLD VENIPUNCTURE: CPT

## 2020-11-21 PROCEDURE — 85461 HEMOGLOBIN FETAL: CPT

## 2020-11-21 PROCEDURE — 65410000002 HC RM PRIVATE OB

## 2020-11-21 PROCEDURE — 74011250636 HC RX REV CODE- 250/636: Performed by: ANESTHESIOLOGY

## 2020-11-21 PROCEDURE — 74011250636 HC RX REV CODE- 250/636: Performed by: OBSTETRICS & GYNECOLOGY

## 2020-11-21 RX ADMIN — HUMAN RHO(D) IMMUNE GLOBULIN 0.3 MG: 300 INJECTION, SOLUTION INTRAMUSCULAR at 16:52

## 2020-11-21 RX ADMIN — KETOROLAC TROMETHAMINE 30 MG: 30 INJECTION, SOLUTION INTRAMUSCULAR at 05:43

## 2020-11-21 RX ADMIN — KETOROLAC TROMETHAMINE 30 MG: 30 INJECTION, SOLUTION INTRAMUSCULAR at 16:51

## 2020-11-21 NOTE — ROUTINE PROCESS
1600- Bedside shift change report given to HAFSA Shen RN (oncoming nurse) by Kamlesh Alberts. Courtney Nair RN (offgoing nurse). Report included the following information SBAR.

## 2020-11-21 NOTE — PROGRESS NOTES
Call from RN, pt is POD1 from [facility] and has borderline UOP. Order for 500ml bolus now and reassess in 1 hr.

## 2020-11-21 NOTE — LACTATION NOTE
This note was copied from a baby's chart. Initial Lactation Consultation: Infant born via C/S this evening to a  mom at 36 3/7 weeks gestation. Mom noted breast changes during the pregnancy and has a negative history impacting lactation. Infant is LGA and BS after birth was 68. Infant has latched a couple of times since birth. (Not seen at breast at this visit). Discussed the elements of a proper latch and good positioning and alignment at breast. Encouraged mom to offer the breast 8-12 times per day or in response to feeding cues.

## 2020-11-21 NOTE — PROGRESS NOTES
Anesthesia Post Operative Day 1      Patient is s/p epidural DuraMorph for Cesearean Section. The patient relates mild pain, no itching and no nausea. All symptoms were treated with protocol meds with good results. Patient is up and ambulating without complaints. Plan: Continue Duramorph protocol as needed.

## 2020-11-21 NOTE — PROGRESS NOTES
C/s    2126: TRANSFER - OUT REPORT:    Verbal report given to HAFSA Peralta RN(name) on Matt Sage  being transferred to room 343 on MIU(unit) for routine progression of care       Report consisted of patients Situation, Background, Assessment and   Recommendations(SBAR). Information from the following report(s) SBAR, Kardex, OR Summary, Procedure Summary, Intake/Output, MAR, Accordion, Recent Results and Med Rec Status was reviewed with the receiving nurse. Lines:   Peripheral IV 11/19/20 Left Forearm (Active)   Site Assessment Clean, dry, & intact 11/19/20 1615   Phlebitis Assessment 0 11/19/20 1615   Infiltration Assessment 0 11/19/20 1615   Dressing Status Clean, dry, & intact 11/19/20 1615   Dressing Type Transparent;Tape 11/19/20 1615   Hub Color/Line Status Pink 11/19/20 1615   Action Taken Blood drawn 11/19/20 1615   Alcohol Cap Used Yes 11/19/20 1615        Opportunity for questions and clarification was provided.       Patient transported with:   Registered Nurse

## 2020-11-21 NOTE — PROGRESS NOTES
Post-Operative Day Number 1 Progress Note    Patient doing well post-op day 1 from  delivery without significant complaints. Pain controlled on current medication. Received small IVF bolus last night-normal UOP now-for hough removal. normal lochia. Vitals:    Patient Vitals for the past 8 hrs:   BP Temp Pulse Resp   20 0543 111/68 97.8 °F (36.6 °C) 84 16   20 0155 109/71 97.8 °F (36.6 °C) 85 16     Temp (24hrs), Av.1 °F (36.7 °C), Min:97.8 °F (36.6 °C), Max:98.8 °F (37.1 °C)      Vital signs stable, afebrile. Exam:  Patient without distress. Abdomen soft, fundus firm at level of umbilicus, non tender. Dressing dry               Lower extremities are negative for swelling, cords or tenderness. Lab/Data Review:  Recent Results (from the past 12 hour(s))   RH IMMUNE GLOBULIN EVAL-LAB ORDER    Collection Time: 20  5:28 AM   Result Value Ref Range    ABO/Rh(D) PENDING     Fetal screen PENDING     WEAK D PENDING    CBC WITH AUTOMATED DIFF    Collection Time: 20  5:28 AM   Result Value Ref Range    WBC 10.5 3.6 - 11.0 K/uL    RBC 3.00 (L) 3.80 - 5.20 M/uL    HGB 9.9 (L) 11.5 - 16.0 g/dL    HCT 28.3 (L) 35.0 - 47.0 %    MCV 94.3 80.0 - 99.0 FL    MCH 33.0 26.0 - 34.0 PG    MCHC 35.0 30.0 - 36.5 g/dL    RDW 12.7 11.5 - 14.5 %    PLATELET 882 580 - 154 K/uL    MPV 10.9 8.9 - 12.9 FL    NRBC 0.0 0  WBC    ABSOLUTE NRBC 0.00 0.00 - 0.01 K/uL    NEUTROPHILS 82 (H) 32 - 75 %    LYMPHOCYTES 8 (L) 12 - 49 %    MONOCYTES 9 5 - 13 %    EOSINOPHILS 0 0 - 7 %    BASOPHILS 0 0 - 1 %    IMMATURE GRANULOCYTES 0 0.0 - 0.5 %    ABS. NEUTROPHILS 8.6 (H) 1.8 - 8.0 K/UL    ABS. LYMPHOCYTES 0.9 0.8 - 3.5 K/UL    ABS. MONOCYTES 0.9 0.0 - 1.0 K/UL    ABS. EOSINOPHILS 0.0 0.0 - 0.4 K/UL    ABS. BASOPHILS 0.0 0.0 - 0.1 K/UL    ABS. IMM.  GRANS. 0.0 0.00 - 0.04 K/UL    DF AUTOMATED           Assessment and Plan:  Patient appears to be having uncomplicated post- course. Continue routine post-op care and maternal education. Acute blood loss anemia-asymptomatic. Girl/benign labs. O negative, rhogam w/u pending.

## 2020-11-21 NOTE — ROUTINE PROCESS
2125:  
 
TRANSFER - IN REPORT: 
 
Verbal report received from JUAN Vu RN (name) on Tila Enrique  being received from L & D (unit) for routine progression of care Report consisted of patients Situation, Background, Assessment and  
Recommendations(SBAR). Information from the following report(s) SBAR was reviewed with the receiving nurse. Opportunity for questions and clarification was provided. Assessment completed upon patients arrival to unit and care assumed.

## 2020-11-21 NOTE — LACTATION NOTE
This note was copied from a baby's chart. Mom states baby has not been latching but she has been hand expressing and giving drops of colostrum. Baby was sleeping at the time of my visit. Mom will call out at when baby is showing feeding cues for help with latching. 1600 - I helped mom with a feeding this afternoon. I helped mom get the baby latched in the cross cradle hold. After several attempts baby was able to get a good latch.  Baby was swallowing at the breast.

## 2020-11-22 VITALS
OXYGEN SATURATION: 93 % | HEIGHT: 67 IN | HEART RATE: 82 BPM | DIASTOLIC BLOOD PRESSURE: 86 MMHG | RESPIRATION RATE: 14 BRPM | TEMPERATURE: 98.1 F | SYSTOLIC BLOOD PRESSURE: 118 MMHG

## 2020-11-22 LAB
ABO + RH BLD: NORMAL
BLD PROD TYP BPU: NORMAL
BPU ID: NORMAL
FETAL SCREEN,FMHS: NORMAL
STATUS OF UNIT,%ST: NORMAL
UNIT DIVISION, %UDIV: 0
WEAK D AG RBC QL: NORMAL

## 2020-11-22 PROCEDURE — 74011250637 HC RX REV CODE- 250/637: Performed by: OBSTETRICS & GYNECOLOGY

## 2020-11-22 RX ORDER — IBUPROFEN 800 MG/1
800 TABLET ORAL
Qty: 30 TAB | Refills: 0 | Status: SHIPPED | OUTPATIENT
Start: 2020-11-22 | End: 2022-02-25

## 2020-11-22 RX ORDER — OXYCODONE AND ACETAMINOPHEN 5; 325 MG/1; MG/1
1 TABLET ORAL
Qty: 30 TAB | Refills: 0 | Status: SHIPPED | OUTPATIENT
Start: 2020-11-22 | End: 2020-11-27

## 2020-11-22 RX ADMIN — IBUPROFEN 800 MG: 400 TABLET, FILM COATED ORAL at 02:13

## 2020-11-22 RX ADMIN — ACETAMINOPHEN 650 MG: 325 TABLET ORAL at 18:00

## 2020-11-22 RX ADMIN — ACETAMINOPHEN 650 MG: 325 TABLET ORAL at 10:07

## 2020-11-22 RX ADMIN — IBUPROFEN 800 MG: 400 TABLET, FILM COATED ORAL at 18:00

## 2020-11-22 RX ADMIN — DOCUSATE SODIUM 100 MG: 100 CAPSULE ORAL at 10:07

## 2020-11-22 RX ADMIN — IBUPROFEN 800 MG: 400 TABLET, FILM COATED ORAL at 10:06

## 2020-11-22 RX ADMIN — ACETAMINOPHEN 650 MG: 325 TABLET ORAL at 06:29

## 2020-11-22 NOTE — DISCHARGE INSTRUCTIONS
Patient Education         Section: What to Expect at Home  Your Recovery     A  section, or , is surgery to deliver your baby through a cut that the doctor makes in your lower belly and uterus. The cut is called an incision. You may have some pain in your lower belly and need pain medicine for 1 to 2 weeks. You can expect some vaginal bleeding for several weeks. You will probably need about 6 weeks to fully recover. It's important to take it easy while the incision heals. Avoid heavy lifting, strenuous activities, and exercises that strain the belly muscles while you recover. Ask a family member or friend for help with housework, cooking, and shopping. This care sheet gives you a general idea about how long it will take for you to recover. But each person recovers at a different pace. Follow the steps below to get better as quickly as possible. How can you care for yourself at home? Activity    · Rest when you feel tired. Getting enough sleep will help you recover.     · Try to walk each day. Start by walking a little more than you did the day before. Bit by bit, increase the amount you walk. Walking boosts blood flow and helps prevent pneumonia, constipation, and blood clots.     · Avoid strenuous activities, such as bicycle riding, jogging, weightlifting, and aerobic exercise, for 6 weeks or until your doctor says it is okay.     · Until your doctor says it is okay, do not lift anything heavier than your baby.     · Do not do sit-ups or other exercises that strain the belly muscles for 6 weeks or until your doctor says it is okay.     · Hold a pillow over your incision when you cough or take deep breaths. This will support your belly and decrease your pain.     · You may shower as usual. Pat the incision dry when you are done.     · You will have some vaginal bleeding. Wear sanitary pads.  Do not douche or use tampons until your doctor says it is okay.     · Ask your doctor when you can drive again.     · You will probably need to take at least 6 weeks off work. It depends on the type of work you do and how you feel.     · Ask your doctor when it is okay for you to have sex. Diet    · You can eat your normal diet. If your stomach is upset, try bland, low-fat foods like plain rice, broiled chicken, toast, and yogurt.     · Drink plenty of fluids (unless your doctor tells you not to).     · You may notice that your bowel movements are not regular right after your surgery. This is common. Try to avoid constipation and straining with bowel movements. You may want to take a fiber supplement every day. If you have not had a bowel movement after a couple of days, ask your doctor about taking a mild laxative.     · If you are breastfeeding, limit alcohol. Alcohol can cause a lack of energy and other health problems for the baby when a breastfeeding woman drinks heavily. It can also get in the way of a mom's ability to feed her baby or to care for the child in other ways. There isn't a lot of research about exactly how much alcohol can harm a baby. Having no alcohol is the safest choice for your baby. If you choose to have a drink now and then, have only one drink, and limit the number of occasions that you have a drink. Wait to breastfeed at least 2 hours after you have a drink to reduce the amount of alcohol the baby may get in the milk. Medicines    · Your doctor will tell you if and when you can restart your medicines. He or she will also give you instructions about taking any new medicines.     · If you take aspirin or some other blood thinner, ask your doctor if and when to start taking it again. Make sure that you understand exactly what your doctor wants you to do.     · Take pain medicines exactly as directed. ? If the doctor gave you a prescription medicine for pain, take it as prescribed.   ? If you are not taking a prescription pain medicine, ask your doctor if you can take an over-the-counter medicine.     · If you think your pain medicine is making you sick to your stomach:  ? Take your medicine after meals (unless your doctor has told you not to). ? Ask your doctor for a different pain medicine.     · If your doctor prescribed antibiotics, take them as directed. Do not stop taking them just because you feel better. You need to take the full course of antibiotics. Incision care    · If you have strips of tape on the incision, leave the tape on for a week or until it falls off.     · Wash the area daily with warm, soapy water, and pat it dry. Don't use hydrogen peroxide or alcohol, which can slow healing. You may cover the area with a gauze bandage if it weeps or rubs against clothing. Change the bandage every day.     · Keep the area clean and dry. Other instructions    · If you breastfeed your baby, you may be more comfortable while you are healing if you place the baby so that he or she is not resting on your belly. Try tucking your baby under your arm, with his or her body along the side you will be feeding on. Support your baby's upper body with your arm. With that hand you can control your baby's head to bring his or her mouth to your breast. This is sometimes called the football hold. Follow-up care is a key part of your treatment and safety. Be sure to make and go to all appointments, and call your doctor if you are having problems. It's also a good idea to know your test results and keep a list of the medicines you take. When should you call for help? Call  911 anytime you think you may need emergency care. For example, call if:    · You have thoughts of harming yourself, your baby, or another person.     · You passed out (lost consciousness).     · You have chest pain, are short of breath, or cough up blood.     · You have a seizure.    Call your doctor now or seek immediate medical care if:    · You have pain that does not get better after you take pain medicine.     · You have severe vaginal bleeding.     · You are dizzy or lightheaded, or you feel like you may faint.     · You have new or worse pain in your belly or pelvis.     · You have loose stitches, or your incision comes open.     · You have symptoms of infection, such as:  ? Increased pain, swelling, warmth, or redness. ? Red streaks leading from the incision. ? Pus draining from the incision. ? A fever.     · You have symptoms of a blood clot in your leg (called a deep vein thrombosis), such as:  ? Pain in your calf, back of the knee, thigh, or groin. ? Redness and swelling in your leg or groin.     · You have signs of preeclampsia, such as:  ? Sudden swelling of your face, hands, or feet. ? New vision problems (such as dimness, blurring, or seeing spots). ? A severe headache. Watch closely for changes in your health, and be sure to contact your doctor if:    · You do not get better as expected. Where can you learn more? Go to http://www.malhotra.com/  Enter M806 in the search box to learn more about \" Section: What to Expect at Home. \"  Current as of: 2020               Content Version: 12.6  © 9735-4129 [employer], [employer]. Care instructions adapted under license by [employer] (which disclaims liability or warranty for this information). If you have questions about a medical condition or this instruction, always ask your healthcare professional. [place] [address] any warranty or liability for your use of this information. Postpartum Support    PARENTS:  Are you feeling sad or depressed? Is it difficult for you to enjoy yourself? Do you feel more irritable or tense? Do you feel anxious or panicky? Are you having difficulty bonding with your baby? Do you feel as if you are \"out of control\" or \"going crazy\"? Are you worried that you might hurt your baby or yourself?       FAMILIES: Do you worry that something is wrong but don't know how to help? Do you think that your partner or spouse is having problems coping? Are you worried that it may never get better? While many women experience some mild mood change or \"the blues\" during or after the birth of a child, 1 in 9 women experience more significant symptoms of depression or anxiety. 1 in 10 Dads become depressed during the first year. Things you can do  Being a good parent includes taking care of yourself. If you take care of yourself, you will be able to take better care of your baby and your family. · Talk to a counselor or healthcare provider who has training in  mood and anxiety problems. · Learn as much as you can about pregnancy and postpartum depression and anxiety. · Get support from family and friends. Ask for help when you need it. · Join a support group in your area or online. · Keep active by walking, stretching or whatever form of exercise helps you to feel better. · Get enough rest and time for yourself. · Eat a healthy diet. · Don't give up! It may take more than one try to get the right help you need.

## 2020-11-22 NOTE — ROUTINE PROCESS
0730: Bedside shift change report given to LANCE Agustin RN (oncoming nurse) by HAFSA Long RN (offgoing nurse). Report included the following information SBAR.  
 
1341: Spoke to Dr. Montana Gregorio about patient wanting to be discharged if  is able to go. Per Dr. Montana Gregorio patient okay to be discharged and to collaborate with Dr. Pedro for discharge prescriptions. 1750: I have reviewed discharge instructions with the patient. The patient verbalized understanding. Patient signed paper discharge instructions. Patient given prescription for motrin and percocet. Educated on surgical site infection prevention in detail.

## 2020-11-22 NOTE — ROUTINE PROCESS
Bedside shift change report given to Eli Milan RN (oncoming nurse) by Denis Velez RN (offgoing nurse). Report included the following information SBAR.

## 2020-11-22 NOTE — ROUTINE PROCESS
2350: Bedside shift change report given to HAFSA Gregorio (oncoming nurse) by Pao Odell RN (offgoing nurse). Report included the following information SBAR.

## 2020-11-22 NOTE — DISCHARGE SUMMARY
Obstetrical Discharge Summary     Name: Maury Souza MRN: [id number]  SSN: [id number]    YOB: 1985  Age: 37 y.o. Sex: female      Allergies: Patient has no known allergies. Admit Date: 2020    Discharge Date: 2020     Admitting Physician: Guicho Fournier MD     Attending Physician:  Veronica Zamora DO     * Admission Diagnoses: 40 weeks gestation of pregnancy [Z3A.40]    * Discharge Diagnoses:   Information for the patient's :  Marvin Snow [[id number]]   Delivery of a 4.27 kg female infant via , Low Transverse on 2020 at 6:08 PM  by Efren Palmer. Apgars were 8  and 9 .        Additional Diagnoses:   Hospital Problems as of 2020 Never Reviewed          Codes Class Noted - Resolved POA    40 weeks gestation of pregnancy ICD-10-CM: Z3A.40  ICD-9-CM: V22.2  2020 - Present Unknown             Lab Results   Component Value Date/Time    ABO/Rh(D) O NEGATIVE 2020 05:28 AM    Rubella, External immune  2020    GrBStrep, External positive  10/16/2020    ABO,Rh O negative  2020      Immunization History   Administered Date(s) Administered    Influenza Vaccine (Quad) Mdck Pf (>4 Yrs Flucelvax QUAD [id number]) 2020    Rho(D) Immune Globulin - IM 2020       * Procedures: LTCS  Procedure(s):   SECTION      [place]  Depression Scale  I have been able to laugh and see the funny side of things: As much as I always could  I have looked forward with enjoyment to things: As much as I ever did  I have blamed myself unnecessarily when things went wrong: Yes, some of the time  I have been anxious or worried for no good reason: Hardly ever  I have felt scared or panicky for no very good reason: No, not much  Things have been getting on top of me: No, most of the time I have coped quite well  I have been so unhappy that I have had difficulty sleeping: No, not at all  I have felt sad or miserable: Not very often  I have been so unhappy that I have been crying: Only occasionally  The thought of harming myself has occurred to me: Never  Total Score: 7    * Discharge Condition: good and stable    * Hospital Course: Normal hospital course following the delivery. * Disposition: Home    Discharge Medications:   Current Discharge Medication List      START taking these medications    Details   ibuprofen (MOTRIN) 800 mg tablet Take 1 Tab by mouth every eight (8) hours as needed for Pain. Qty: 30 Tab, Refills: 0      oxyCODONE-acetaminophen (PERCOCET) 5-325 mg per tablet Take 1 Tab by mouth every four (4) hours as needed for Pain for up to 5 days. Max Daily Amount: 6 Tabs. Qty: 30 Tab, Refills: 0    Associated Diagnoses: Status post  delivery         CONTINUE these medications which have NOT CHANGED    Details   PNV TD.40/GTECSBO fum/folic ac (PRENATAL PO) Take 1 Tab by mouth. * Follow-up Care/Patient Instructions: Activity: No driving while on analgesics and No heavy lifting for 6 weeks, nothing in vagina for 6 weeks  Diet: Regular Diet  Wound Care: Keep wound clean and dry and As directed    Follow-up Information     Follow up With Specialties Details Why Contact Info    Yovana Quezada, [address] [facility], Gynecology, Obstetrics   [address] [facility] [address] [phone]                        .

## 2020-11-22 NOTE — LACTATION NOTE
This note was copied from a baby's chart. Infant nursing well, per mom with audible swallowing noted when at breast. Mom has easily expressed colostrum. Infant currently under phototherapy for a HR bili. Demonstrated manual expression to mom and we obtained 3 ml and provided it to infant via syringe. Discussed how bilirubin is broken down and excreted. Provided mom with education for set up, use and cleaning of the hospital grade pump, which she will use following nursing sessions (or manually express) for additional stimulation/colostrum removal.   24 hour weight loss 6.5%; 5 voids and 8 stools noted since delivery. Mom will continue nursing at breast every 2.5-3 hours, limiting infant's time from out of the lights to 30 minutes.

## 2020-11-22 NOTE — PROGRESS NOTES
Post-Operative Day Number 2 Progress Note    Patient doing well post-op day 2 from  delivery without significant complaints. Pain controlled on current medication. Voiding without difficulty, normal lochia. Vitals:    Patient Vitals for the past 8 hrs:   BP Temp Pulse Resp   20 0815 114/77 97.8 °F (36.6 °C) 76 18     Temp (24hrs), Av.8 °F (36.6 °C), Min:97.6 °F (36.4 °C), Max:98 °F (36.7 °C)      Vital signs stable, afebrile. Exam:  Patient without distress. Abdomen soft, fundus firm at level of umbilicus, non tender. Incision dry and clean without erythema. Lower extremities are negative for swelling, cords or tenderness. Lab/Data Review:  No labs    Lab Results   Component Value Date/Time    Rubella, External immune  2020    GrBStrep, External positive  10/16/2020    HBsAg, External negative  2020    HIV, External non reactive  2020    T. Pallidum Antibody, External non reactive  2020         Assessment and Plan:  Patient appears to be having uncomplicated post- course. Continue routine post-op care and maternal education.   Girl, rh neg, RI

## 2021-12-03 ENCOUNTER — HOSPITAL ENCOUNTER (EMERGENCY)
Age: 36
Discharge: HOME | End: 2021-12-03
Payer: COMMERCIAL

## 2021-12-03 PROCEDURE — 74011250636 HC RX REV CODE- 250/636: Performed by: OBSTETRICS & GYNECOLOGY

## 2021-12-03 PROCEDURE — 99281 EMR DPT VST MAYX REQ PHY/QHP: CPT

## 2021-12-03 PROCEDURE — 96372 THER/PROPH/DIAG INJ SC/IM: CPT

## 2021-12-03 RX ADMIN — HUMAN RHO(D) IMMUNE GLOBULIN 0.3 MG: 300 INJECTION, SOLUTION INTRAMUSCULAR at 17:04

## 2021-12-03 NOTE — PROGRESS NOTES
1637 Patient arrived from [facility]'s office post amniocentesis for a rhogam injection.      1708 Patient discharged home

## 2021-12-04 LAB
BLD PROD TYP BPU: NORMAL
BPU ID: NORMAL
GA (WEEKS): NORMAL WK
STATUS OF UNIT,%ST: NORMAL
UNIT DIVISION, %UDIV: 0

## 2022-02-18 ENCOUNTER — HOME VISIT (OUTPATIENT)
Dept: PALLATIVE CARE | Facility: CLINIC | Age: 37
End: 2022-02-18

## 2022-02-18 DIAGNOSIS — O35.BXX0 FETAL CARDIAC DISEASE AFFECTING PREGNANCY, SINGLE OR UNSPECIFIED FETUS: Primary | ICD-10-CM

## 2022-02-18 NOTE — PROGRESS NOTES
Medical Social Work Admission Assessment    Rosy Zaragoza is a 31 y.o. female     Primary  Language English   needed? no   utilized during visit? no    Members of the household:  Name:  Justina Moran, patient (mother)  Kevin Desouza, / father  Patricio Pedersen (17 month old) sister        Family Members/Significant Others Not a Member of the Household: family did not reference extended family. Encounter Diagnoses   Name Primary?  Fetal cardiac disease affecting pregnancy, single or unspecified fetus Yes       History of Diagnosis: dx at 20 week US     Patients Description of Illness/Current Health Status: Parents reported that they initially had a difficult time with the diagnosis, and that it has become a little easier as they have gotten more information related to the diagnosis and options. Knowledge/Understanding of Disease Process    1. Parent/Patient demonstrate knowledge/understanding of disease process     2. Parent/Patient demonstrate knowledge/understanding of treatment plan     3. Parent/Patient demonstrate knowledge/understanding of prognosis    4. Parent/Patient demonstrateacceptance of prognosis     5.  Parent/Patient demonstrate knowledge/understanding of resuscitation status - No discussed      [place] of Care (pee all that apply)  [ X] no burden evident     [ ]  Family must administer medications   [ ]  Illness causing financial strain  [ ]  Family/Support feels overwhelmed   [ ]  Family/Support sleep disturbed with patient's care  [ ]  Patient's care causes extra physical stress   [ ]  Illness causes changes in family lifestyle   [ ]  Illness impacting family/support employment  [ ]  Family experiencing increased time demands   [ ]  Patient's behavior endangers family   [ ]  Denial of patients illness   [ ] Concern over outcome of illness/fear of death  [ ]  Patient's behavior embarrassing to faimily    Notes          Social support systems (select one best description)  Good social support system which includes two or less willing family members or friends   Notes     Risk Factors (pee all that apply)   Origen.Coil ] No risk factors present         [ ]  Alcohol abuse       [ ]  Financial resources inadequate to meet basic (food/house/etc.)        [ ]  Financial resources inadequate to meet health care needs      (supplies/equipment/medications)        [ ]   Food/Nutrition resources inadequate        [ ]  Home environment unsafe/inadequate for home care        [ ]   Physical limitation increase likelihood of falls        [ ]  Plan of care/treatments complicated        [ ]  Substance use/abuse        [ ]  Suicidal risk        [ ]   Visual impairment threatens safety        [ ]   Other   Notes       Current Sources of Stress in Addition to Current Illness Origen.Coil ] None reported      Bills/Debt      [ ]  Career/Job change      [ ]    (short term)      [ ]   (long term)      [ ]  Death of a child (recent)      [ ]  Death of a parent (recent)      [ ]  Death of a spouse (recent)      [ ]  Employment status changed     [ ]  Family discord      [ ]   Financial loss/Inadequate income      [ ]   Job loss      [ ]   Legal issues unresolved     [ ]   Lifestyle change     [ ]   Marital discord      [ ]   Marriage within the last year     [ ]   Paperwork (insurance/legal/etc.) overwhelming      [ ]   Separation/Divorce     [ ]   Other     Notes Dad discussed some insurance questions and worries about major bills. Abuse/Neglect (actual/potential risks)     [ X] No signs of abuse/neglect                                       [ ] A- Physical   [ ] B-Sexual       [ ]  1. History of abuse/neglect     [ ]  2. History of domestic violence     [ ]  3. Lacks adequate physical care     [ ]  4. Lacks emotional nurturing/support    [ ]  5. Lacks appropriate stimulation/cognitive experiences     [ ]  6. Left alone inappropriately     [ ]  7.  Lacks necessary supervision     [ ]  8. Inadequate or delayed medical care    [ ]  9. Unsafe environment (I.e. Guns/drug use/ history of violence in the home/etc.)    [ ] 10. Bruising or other physical signs of injury present     [ ] 11. Other     [ ]  Refer to child protective services     [ ] Other       Emotional Status     Patient: Both parents  had appropriate emotions related to their son's diagnosis and were able to verbalize their feelings clearly. Stress Level Reported by Patient   0 = no stress    10 = maximum stress  No assessed      Coping by Patient   Copying Skills Patient (strengths/ weaknesses) Parents appeared to both benefit from gaining medical information to help in their decision making and finding comfort in having all of the information. Caregiver Employment      Den Blanco works as a  at [employer] and Danybarbaraia Luke works in [employer]. Current [employer] Being Utilized- None    Interventions/Plan of Care  LCSW will see family as needed to provide anticipatory grief support, assist in care management questions as applicable, and be available to provide resources for the sibling as requested. Community Resources Planning/Referrals: None. DDNR :NO    My wishes given to caregiver/discussed: NO     Arrangements  Not discussed    Needs Assistance    available to assist family as needed      History of Losses  Not assessed     Past Grieving Process  Not assessed    Biggest challenges at this time for pt/caregiver/family: Worry that they will have to make a fast decision in the \"heat of the moment\" and that it won't be in the best interest of Jose Elias's quality of life. Hopes  For a continued good pregnancy and that Abraham Marrow will have a good QOL for however long it is. Fears: That they would do an intervention that may cause him long term suffering, put them into debt, and not improve his quality of life.        Clinical Assessment/POC Recommendations LCSW joined RN and MD in meeting parents today for their admission visit. LCSW reviewed social work role and is available to parents as needed to answer care management questions (recommendation to father to check their individual/family deductibles and out of pocket max) and available for anticipatory grief support as needed for parents and 17 month old sibling.

## 2022-02-18 NOTE — PROGRESS NOTES
[facility] [id number]  Office:  [phone]  Fax: [phone]      NURSING ADMISSION NOTE    Date of Visit: 22    Diagnosis:    ICD-10-CM ICD-9-CM    1. Fetal cardiac disease affecting pregnancy, single or unspecified fetus  O35. 8XX0 655.83            Nursing Narrative:    Maya Padgett and her  Edy in the [facility] office today to admit to the [facility] program for  palliative care. Present were SRINIVAS Willams MD, Yan Romero LCSW, and myself, Mikey Howard RN. They have one child at home, Kim Watkins who is 17 months old. 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 Brooklynn Kiser is pregnant with their second child and is 26 weeks gestational age with an estimated date of delivery of 22. She plans to deliver at [facility].     At her regularly scheduled 20 week ultrasound appointment with OB Dr. Dania Hernandez and Panfilo Acevedo were told that their baby boy John Snow) has some cardiac anomalies that would need further investigation by a fetal cardiologist. An echocardi[facility] with Dr. Shelby Crowell with [facility] demonstrated the following cardiac anomalies: a complete atrioventricular septal defect, thickened AV valve, mild to moderate AV valve regurgitation, right atrial isomerism with bilateral SVCs and bilateral right atrial appendages, double outlet right ventricle, pulmonary valve atresia, absence of ductus arteriosus, pulmonary artery hypoplasia, small major arteriopulmonary collateral vessels (MAPCAS). Also present are hydrops fetalis, bilateral pleural effusions, and ascites. All findings consistent with heterotaxy syndrome. Jose Elias's pulmonary arteries would not be adequate to allow for single ventricle palliation. He is unlikely to be a surgical candidate. Cardiology and OB referred the [facility] to [facility] for  palliative care. NC team explained our program and the supports that we can provide to them regarding birth planning, synthesizing information from specialties to help them make difficult decisions, making decisions about interventions for Michael Martinez based on his condition and their goals for him and for their family, and emotional support and bereavement support in the event that Jose Elias's condition proves fatal. They are amenable to setting up an appointment with the team for birth planning after his fetal cardiac echo on 2/24/22 and OB visit on 2/25/22 (which I will accompany them to). Rene Patton expressed good understanding of Jose Elias's condition and prognosis, and state that their priority is that he not suffer. They are concerned about how Jose Elias's prognosis affects their family, considering emotional distress and financial hardship that could arise from Jose Elias's treatment. Admission process was completed and I plan to attend appointments with Aga (and possibly Edy) on 2/24 and 2/25. A birth planning meeting is tentatively scheduled for Friday 3/4. I provided the family with NC admission packet and a list of birth questions for them to consider prior to the birth planning appointment. They verbalized understanding to reach out to [place] staff for any questions or to inform us if Ingrid Stafford is in labor. Hospital Preference: [address] [facility] Team:  Patient Care Team:  Supriya Marino DO as PCP - General (Obstetrics & Gynecology)  Supriya Marino DO as Obstetrician (Obstetrics & Gynecology)  Andreia Montanez DO as Physician (Pediatric Cardiology)        MEDICATION MANAGEMENT:  Current Outpatient Medications   Medication Sig Dispense Refill    ibuprofen (MOTRIN) 800 mg tablet Take 1 Tab by mouth every eight (8) hours as needed for Pain. 30 Tab 0    PNV QZ.52/BPYQKGX fum/folic ac (PRENATAL PO) Take 1 Tab by mouth. ACUITY LEVEL:  [] High /  [] Medium  /  [x] Low      ACTION ITEMS:  1.  Continue support and education of family  2. Attend clinic visits as requested by family     FOLLOW UP VISIT:  February 24, 2022 Pediatric Cardiology  February 25, 2022 OB/GYN      Thank you for allowing [facility] to participate in this patient and family's care. Please call the [facility] office at [phone] with any questions or concerns.

## 2022-02-23 NOTE — PROGRESS NOTES
Phone [phone]   Fax [phone]  [facility] Pediatric Palliative and Hospice Care      Date of Referral: 2/10/22  Gestational Age: ~32wga  Sex of Baby: Male  Name of Baby: Chelly Bravo  HAO: 22 (32wga)  Reason for Consult: Complex congenital heart disease    Date of Current Visit: 22  Location of Current Visit:    [] Home  [x] Other: [facility] office       Attendees:       Palliative Care: Sherman Keen RN, Larry DIEGOW, myself       Spiritual Care: Not present       NICU : Not present    HPI:   Florinda Carter is a 32 yo  F currently ~32wga pregnant with a male fetus Nora Hua) referred due to presence of 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 He has right atrial isomerism with bilateral SVCs and bilateral right atrial appendages. 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 At her visit with Dr. Michael Frost on 1/10/22, Dr. Michael Frost shared the complexities of Jose Elias's heart disease and that it would likely require (best case) single ventricle palliation which would be fraught with difficulty. Given the new finding of hydrops fetalis, Dr. Michael Frost counseled the family of likely poor prognosis including possible fetal demise. She did not recommend changing the location of her delivery given likely poor prognosis. Rosy and Edy expressed understanding and not wanting Jose Elias to suffer, so Dr. Michael Frost recommended referral to [facility] for support and birth planning.       Orientation to Labor/Delivery and NICU: No    Healthcare Team of Patient: Dr. Georgina Centeno (OB), Dr. Michael Frost (pediatric cardiology)    Pregnancy/ Fetus Issues:  OB History    Para Term  AB Living   2 1 1 0 0 1   SAB IAB Ectopic Molar Multiple Live Births   0 0 0 0 0 1       Parents understanding of condition: Rafael understand Jose Elias's heart disease to be \"inoperable\" and therefore want to make sure that Kirit Flores doesn't suffer and live a life that does not have quality. Social/Spiritual Assessment: See documentation from [facility] from the same day. Spiritual assessment not performed today. Other children: Clearance Juan 15 mos    Plan:  -Admission paperwork signed today. Program review provided. A copy of the birth planning questions provided for Rafael's review prior to the birth planning visit. -Raj Luong has a follow-up apt with pediatric cardiology on  and then her OB on . Melissa Barton to attend.    -Scheduled a birth planning session for 3/4 at 3:30pm.      Thank you for including  palliative care as part of Rosy's  care. Please call our office at [phone] with any questions or concerns. Josh Ledesma.  Lakshmi Smart MD  Medical Director  [facility]

## 2022-02-23 NOTE — PATIENT INSTRUCTIONS
It was a pleasure seeing you and Hernandez Jenkins for an admission visit on 2/18/22. At our visit we discussed: This is the plan we talked about:     1. Melissa Barton RN to attend pediatric cardiology visit on 2/24/22. 2. Follow-up with our team for birth planning on 3/4/22 at 3:30pm.      This is what you have shared with us about Ruby Diego 81. Planning 11/19/2020   Confirm Advance Directive None   Patient Would Like to Complete Advance Directive No         The [facility] pediatric palliative care team is here to support you and your family. Our office will call you to confirm your appointment in advance. Please let us know if you need to reschedule or be seen sooner by calling our office at [phone]. Sincerely,    Josh Ledesma.  Lakshmi Smart MD and the [facility]

## 2022-02-25 ENCOUNTER — CLINICAL SUPPORT (OUTPATIENT)
Dept: PALLATIVE CARE | Facility: CLINIC | Age: 37
End: 2022-02-25

## 2022-02-25 DIAGNOSIS — Z3A.33 33 WEEKS GESTATION OF PREGNANCY: ICD-10-CM

## 2022-02-25 DIAGNOSIS — O35.BXX0 FETAL CARDIAC DISEASE AFFECTING PREGNANCY, SINGLE OR UNSPECIFIED FETUS: Primary | ICD-10-CM

## 2022-02-25 NOTE — PROGRESS NOTES
[id number]  Office:  [phone]  Fax: [phone]      NURSING CLINIC VISIT NOTE    Date of Visit: 22    Diagnosis:    ICD-10-CM ICD-9-CM    1. Fetal cardiac disease affecting pregnancy, single or unspecified fetus  O35. 8XX0 655.83    2. 33 weeks gestation of pregnancy  Z3A.33 V22.2        Nursing Narrative:    Attended routine OB visit with Joan Collado who is 32w6d pregnant with a single male baby. 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 He has right atrial isomerism with bilateral SVCs and bilateral right atrial appendages. 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 Lesly Kearney was admitted to the [facility] Pediatric Palliative and Hospice program for  palliative care. Lesly Kearney is measuring larger than previously and she says she has felt that her belly is bigger and tighter. A fetal cardiology appointment with fetal echo is scheduled for 22. Otherwise she has mild edema in her legs and feet only. There are not other major concerns for now from the OB standpoint. MEDICATION MANAGEMENT:  Current Outpatient Medications   Medication Sig Dispense Refill    PNV CT.21/OMEFKEJ fum/folic ac (PRENATAL PO) Take 1 Tab by mouth. ACUITY LEVEL:  [] High /  [] Medium  /  [x] Low      ACTION ITEMS:  1. Continue support and education of family  2.   Attend clinic visits as requested by family     FOLLOW UP VISIT:  Birth planning appointment with [facility] team scheduled for 3/4/2022 at 3:30 PM.          Thank you for allowing [facility] to participate in this patient and family's care. Please call the [facility] office at [phone] with any questions or concerns.

## 2022-02-28 ENCOUNTER — OFFICE VISIT (OUTPATIENT)
Dept: PALLATIVE CARE | Facility: CLINIC | Age: 37
End: 2022-02-28

## 2022-02-28 DIAGNOSIS — O35.BXX0 FETAL CARDIAC DISEASE AFFECTING PREGNANCY, SINGLE OR UNSPECIFIED FETUS: Primary | ICD-10-CM

## 2022-03-01 ENCOUNTER — OFFICE VISIT (OUTPATIENT)
Dept: PALLATIVE CARE | Facility: CLINIC | Age: 37
End: 2022-03-01

## 2022-03-01 DIAGNOSIS — O35.BXX0 FETAL CARDIAC DISEASE AFFECTING PREGNANCY, SINGLE OR UNSPECIFIED FETUS: Primary | ICD-10-CM

## 2022-03-01 NOTE — PROGRESS NOTES
[facility] [address] [id number]  Office:  [phone]  Fax: [phone]      NURSING CLINIC VISIT NOTE    Date of Visit: 02/28/22    Diagnosis:    ICD-10-CM ICD-9-CM    1. Fetal cardiac disease affecting pregnancy, single or unspecified fetus  O35. 8XX0 828.47             Nursing Narrative:  Stephon Cassidy for an appointment today with [address] Pediatric Cardiology team at [facility]. Her last appointment and fetal cardiac echo were about 6 weeks ago. Previous scans have shown that baby Edilberto Wallace has the following cardiac anomalies: a complete atrioventricular septal defect, thickened AV valve, mild to moderate AV valve regurgitation, right atrial isomerism with bilateral SVCs and bilateral right atrial appendages, double outlet right ventricle, pulmonary valve atresia, absence of ductus arteriosus, pulmonary artery hypoplasia, small major arteriopulmonary collateral vessels (MAPCAS). Also present are hydrops fetalis, bilateral pleural effusions, and ascites. All findings consistent with heterotaxy syndrome. Today's scan was more reassuring and Dr. Duane Rideau discussed the findings with Rosy. The right pleural effusion is larger than it was previously, but she does not observe any additional fluid collection indicative of severe or increasing hydrops. Edilberto Wallace is moving very well and breathing frequently. The effusion may be chylous (lymphatic) in nature. Additionally Jose Elias's collateral vessels are larger and she believes that with treatment for the effusion he could have adequate oxygenation and breathe on his own.  She explained that the fact that the heart defects are inoperable does not mean that interventions such as a chest tube shouldn't be considered to give him the best chance at survival.     While unable to predict a time frame for Jose Elias, Dr. Duane Rideau believes that Edilberto Wallace is likely to survive delivery and may go home, giving the family days to weeks to spend with Kasie Nice and perhaps more time. She discussed that if the pleural effusion was to be treated he may require a breathing tube or some non-invasive positive pressure ventilation, but that she did not expect that he would need this assistance long term. Ultimately a neonatologist would dictate care after delivery and further imaging and testing would be necessary to determine structure and function of Jose Elias's collateral circulation and lymphatic tissues. We discussed meeting with the NICU staff after birth planning this week and prior to delivery. Dr. Priyank Friend notified Eagle Milner that she would like the [address] [place] [address] to reevaluate Rosy either today or tomorrow due to the fact that she had gained more fluid weight in the hopes that they can determine if there is fluid collection indicative of worsening hydrops that she is not seeing in her scan. If Eagle Milner is not showing evidence of increasing hydrops and her blood pressure and other measurements are normal then her providers are likely to move forward with more frequent monitoring, perhaps weekly. It is Dr. Murrieta Clamp hope that Rosy can carry Jose Elias for at least another two weeks to reduce complications from premature birth. Induction may be necessary if Rosy's health declines or if hydrops or other complications are worsening. The [address] [place] [address] was unable to see Eagle Milner today but she will go for an appointment tomorrow morning 3/1 and I will accompany her. Eagle Milner and her  Anselmo Matthew will come to the [facility] office for a birth planning appointment this Friday 3/4/22 and we will discuss how changes in Jose Elias's prognosis may affect decision making.  Rosy and I discussed that this is wonderful news that Kasie Nice may have more potential to be born alive and spend some time with his family, but that we will need to discuss potential plans depending on how he looks at delivery and what their hopes and wishes are for him. MEDICATION MANAGEMENT:  Current Outpatient Medications   Medication Sig Dispense Refill    PNV FN.53/LVUHGSB fum/folic ac (PRENATAL PO) Take 1 Tab by mouth. ACUITY LEVEL:  [] High /  [] Medium  /  [x] Low      ACTION ITEMS:  1. Continue support and education of family  2. Attend clinic visits as requested by family     FOLLOW UP VISIT:  Follow-up and Dispositions    · Return in about 1 day (around 3/1/2022) for [facility]. appointment . Thank you for allowing [facility] to participate in this patient and family's care. Please call the [facility] office at [phone] with any questions or concerns.

## 2022-03-02 NOTE — PROGRESS NOTES
[facility] [address] [id number]  Office:  [phone]  Fax: [phone]      NURSING CLINIC VISIT NOTE    Date of Visit: 03/01/22    Diagnosis:    ICD-10-CM ICD-9-CM    1. Fetal cardiac disease affecting pregnancy, single or unspecified fetus  O35. 8XX0 655.83             Nursing Narrative:    Shruthi Paul at the [facility]. for an appointment with maternal fetal medicine team. Florinda Carter had an ultrasound which was then reviewed by Dr. Dinorah Mccormick. Like Dr. Michael Frost he believes that the primary areas of concern for fluid collection are the right sided pleural effusion and polyhydramnios. He does not see any edema in the skin, other lung, or other tissues and no ascites. He believes that pressure in the chest cavity may be pressing on the esophagus causing Jose Elias to have difficulty swallowing fluid, which may result in some of the extra fluid present. At this time Florinda Carter is not showing any signs that she isn't tolerating the fluid increase other than some discomfort and tightness in her abdomen. Dr. Dinorah Mccormick also agreed that getting as much time as possible for baby Jose Elias's lungs and brain to mature is ideal, but this must be weighed against further development of hydrops and Rosy's own health. She had an NST performed and did well. Chelly Bravo showed good beat to beat variability and she only showed minor uterine irritability. At this time Dr. Dinorah Mccormick would like to increase surveillance seeing Florinda Carter twice weekly. He also recommends steroid injections to better prepare Marys lungs for delivery, despite her being slightly past the general guideline of 34 weeks or earlier for steroids. He will coordinate with Dr. Chana Schaffer office (OB) to have the steroids given.  He supports the idea of discussing Rosy's case with the neonatology team at [facility] prior to delivery so they are prepared for the likelihood that Chelly Bravo will need to have his pleural effusion drained. They can also help explain interventions they may recommend so that Rosy and her  Kati Perez can take these into account for birth planning. Overall Rosy is cautiously optimistic that Michael Martinez may be born alive and he may be able to spend more time with his family than initially expected. NC team will discuss birth plan in further detail with Kati Perez and Rosy on Friday 3/4/22. MEDICATION MANAGEMENT:  Current Outpatient Medications   Medication Sig Dispense Refill    PNV QW.78/NRYKKVE fum/folic ac (PRENATAL PO) Take 1 Tab by mouth. ACUITY LEVEL:  [] High /  [] Medium  /  [x] Low      ACTION ITEMS:  1. Continue support and education of family  2. Attend clinic visits as requested by family     FOLLOW UP VISIT: MFM appointment later this week, OB appt TBD          Thank you for allowing [facility] to participate in this patient and family's care. Please call the [facility] office at [phone] with any questions or concerns.

## 2022-03-04 ENCOUNTER — HOSPITAL ENCOUNTER (EMERGENCY)
Age: 37
Discharge: HOME | End: 2022-03-04
Attending: OBSTETRICS & GYNECOLOGY | Admitting: OBSTETRICS & GYNECOLOGY
Payer: COMMERCIAL

## 2022-03-04 VITALS
OXYGEN SATURATION: 98 % | HEART RATE: 98 BPM | DIASTOLIC BLOOD PRESSURE: 75 MMHG | TEMPERATURE: 98.4 F | BODY MASS INDEX: 29.66 KG/M2 | HEIGHT: 67 IN | WEIGHT: 189 LBS | RESPIRATION RATE: 18 BRPM | SYSTOLIC BLOOD PRESSURE: 117 MMHG

## 2022-03-04 LAB
ERYTHROCYTE [DISTWIDTH] IN BLOOD BY AUTOMATED COUNT: 11.9 % (ref 11.5–14.5)
HCT VFR BLD AUTO: 31.5 % (ref 35–47)
HGB BLD-MCNC: 10.6 G/DL (ref 11.5–16)
MCH RBC QN AUTO: 31 PG (ref 26–34)
MCHC RBC AUTO-ENTMCNC: 33.7 G/DL (ref 30–36.5)
MCV RBC AUTO: 92.1 FL (ref 80–99)
NRBC # BLD: 0 K/UL (ref 0–0.01)
NRBC BLD-RTO: 0 PER 100 WBC
PLATELET # BLD AUTO: 229 K/UL (ref 150–400)
PMV BLD AUTO: 11.4 FL (ref 8.9–12.9)
RBC # BLD AUTO: 3.42 M/UL (ref 3.8–5.2)
WBC # BLD AUTO: 11.2 K/UL (ref 3.6–11)

## 2022-03-04 PROCEDURE — 36415 COLL VENOUS BLD VENIPUNCTURE: CPT

## 2022-03-04 PROCEDURE — 86870 RBC ANTIBODY IDENTIFICATION: CPT

## 2022-03-04 PROCEDURE — 99282 EMERGENCY DEPT VISIT SF MDM: CPT

## 2022-03-04 PROCEDURE — 86920 COMPATIBILITY TEST SPIN: CPT

## 2022-03-04 PROCEDURE — 86921 COMPATIBILITY TEST INCUBATE: CPT

## 2022-03-04 PROCEDURE — 85027 COMPLETE CBC AUTOMATED: CPT

## 2022-03-04 PROCEDURE — 86922 COMPATIBILITY TEST ANTIGLOB: CPT

## 2022-03-04 PROCEDURE — 65270000029 HC RM PRIVATE

## 2022-03-04 PROCEDURE — 74011250636 HC RX REV CODE- 250/636: Performed by: OBSTETRICS & GYNECOLOGY

## 2022-03-04 PROCEDURE — 86900 BLOOD TYPING SEROLOGIC ABO: CPT

## 2022-03-04 RX ORDER — BETAMETHASONE SODIUM PHOSPHATE AND BETAMETHASONE ACETATE 3; 3 MG/ML; MG/ML
12 INJECTION, SUSPENSION INTRA-ARTICULAR; INTRALESIONAL; INTRAMUSCULAR; SOFT TISSUE ONCE
Status: COMPLETED | OUTPATIENT
Start: 2022-03-04 | End: 2022-03-04

## 2022-03-04 RX ADMIN — BETAMETHASONE SODIUM PHOSPHATE AND BETAMETHASONE ACETATE 12 MG: 3; 3 INJECTION, SUSPENSION INTRA-ARTICULAR; INTRALESIONAL; INTRAMUSCULAR at 11:29

## 2022-03-04 NOTE — H&P
History & Physical    Name: Kristal Kelly MRN: [id number]  SSN: [id number]    YOB: 1985  Age: 31 y.o. Sex: female        Subjective:     Estimated Date of Delivery: 22  OB History    Para Term  AB Living   2 1 1 0 0 1   SAB IAB Ectopic Molar Multiple Live Births   0 0 0 0 0 1      # Outcome Date GA Lbr Jordi/2nd Weight Sex Delivery Anes PTL Lv   2 Current            1 Term 20 40w3d 11:35 / 03:33 4.27 kg F CS-LTranv EPI N KARIN       Ms. Michael Ricardo is admitted with pregnancy at 34w6d for prolonged monitoring after 2 decels to 90s on NST in [facility] office (Dr Lesia Damian). Pt was having contractions that started this morning and got more uncomfortable while here (approx every 4 mins) but now have spaced out significantly and are no longer uncomfortable after eating and ivf's. Patient reports normal fetal movement. . Prenatal course was complicated by complex cardiac anomaly with likely poor prognosis, fetal right pleural effusion but no hydrops, polyhydramnios with ammon 39, GBS in urine, h/o  with G1 due to 2nd stage arrest  of 9-6 baby, rh negative s/p rhogam 21 after amnio and again on 3/3/22 (12 weeks later), s/p BMZ x 2 (2nd dose today). Please see prenatal records for details. History reviewed. No pertinent past medical history.   Past Surgical History:   Procedure Laterality Date    HX  SECTION      delivered  2020    HX OTHER SURGICAL      knee surgery      Social History     Occupational History    Not on file   Tobacco Use    Smoking status: Never Smoker    Smokeless tobacco: Never Used   Vaping Use    Vaping Use: Never used   Substance and Sexual Activity    Alcohol use: Not Currently    Drug use: Never    Sexual activity: Not on file     Family History   Problem Relation Age of Onset    Diabetes Father     Cancer Sister     Cancer Paternal Grandfather     Diabetes Paternal Grandfather        No Known Allergies  Prior to Admission medications    Medication Sig Start Date End Date Taking? Authorizing Provider   PNV ZG.25/YPSVHTO fum/folic ac (PRENATAL PO) Take 1 Tab by mouth. Yes Provider, Historical        Review of Systems: Pertinent items are noted in HPI. Objective:     Vitals:  Vitals:    22 1101 22 1145 22 1211 22 1217   BP: 128/80   117/75   Pulse: (!) 112   98   Resp: 18      Temp: 98.4 °F (36.9 °C)      SpO2: 99%  98% 98%   Weight:  85.7 kg (189 lb)     Height:  5' 7\" (1.702 m)          Physical Exam:  Patient without distress. Abdomen: soft, nontender, gravid  Cervical Exam: Closed/Thick/High  Membranes:  Intact  Fetal Heart Rate: Reactive  Baseline: 125s per minute  Variability: moderate  Accelerations: yes  Decelerations: none  Uterine contractions: every 4 mins initially, now every 5-10 mins    Prenatal Labs:   Lab Results   Component Value Date/Time    ABO/Rh(D) O NEGATIVE 2022 11:20 AM    Rubella, External immune  2020 12:00 AM    GrBStrep, External positive  10/16/2020 12:00 AM    HBsAg, External negative  2020 12:00 AM    HIV, External non reactive  2020 12:00 AM    ABO,Rh O negative  2020 12:00 AM         Assessment/Plan:     Active Problems:    * No active hospital problems. *       Plan: 30 y/o  at 29 6/7 weeks with know fetal complex cardiac anomaly with likely poor prognosis admitted for prolonged monitoring after 2 decels on NST in [facility] office this morning. Category I tracing here. No decelerations detected here. Contractions have spaced out with ivfs and cervix is thick and closed. Discussed with Dr Annalee Mosquera ([facility]) who is in agreement with discharge home with follow up in his office on Monday. Strict fm precautions reviewed. Pt had NICU consult today as well as 2nd BMZ injection.

## 2022-03-04 NOTE — DISCHARGE INSTRUCTIONS
Patient Education        Pregnancy Precautions: Care Instructions  Your Care Instructions     There is no sure way to prevent labor before your due date ( labor) or to prevent most other pregnancy problems. But there are things you can do to increase your chances of a healthy pregnancy. Go to your appointments, follow your doctor's advice, and take good care of yourself. Eat well, and exercise (if your doctor agrees). And make sure to drink plenty of water. Follow-up care is a key part of your treatment and safety. Be sure to make and go to all appointments, and call your doctor if you are having problems. It's also a good idea to know your test results and keep a list of the medicines you take. How can you care for yourself at home? · Make sure you go to your prenatal appointments. At each visit, your doctor will check your blood pressure. Your doctor will also check to see if you have protein in your urine. High blood pressure and protein in urine are signs of preeclampsia. This condition can be dangerous for you and your baby. · Drink plenty of fluids. Dehydration can cause contractions. If you have kidney, heart, or liver disease and have to limit fluids, talk with your doctor before you increase the amount of fluids you drink. · Tell your doctor right away if you notice any symptoms of an infection, such as:  ? Burning when you urinate. ? A foul-smelling discharge from your vagina. ? Vaginal itching. ? Unexplained fever. ? Unusual pain or soreness in your uterus or lower belly. · Eat a balanced diet. Include plenty of foods that are high in calcium and iron. ? Foods high in calcium include milk, cheese, yogurt, almonds, and broccoli. ? Foods high in iron include red meat, shellfish, poultry, eggs, beans, raisins, whole-grain bread, and leafy green vegetables. · Do not smoke. If you need help quitting, talk to your doctor about stop-smoking programs and medicines.  These can increase your chances of quitting for good. · Do not drink alcohol or use marijuana or illegal drugs. · Follow your doctor's directions about activity. Your doctor will let you know how much, if any, exercise you can do. · Ask your doctor if you can have sex. If you are at risk for early labor, your doctor may ask you to not have sex. · Take care to prevent falls. During pregnancy, your joints are loose, and your balance is off. Sports such as bicycling, skiing, or in-line skating can increase your risk of falling. And don't ride horses or motorcycles, dive, water ski, scuba dive, or parachute jump while you are pregnant. · Avoid getting very hot. Do not use saunas or hot tubs. Avoid staying out in the sun in hot weather for long periods. Take acetaminophen (Tylenol) to lower a high fever. · Do not take any over-the-counter or herbal medicines or supplements without talking to your doctor or pharmacist first.  When should you call for help? Call 911  anytime you think you may need emergency care. For example, call if:    · You passed out (lost consciousness).     · You have a seizure.     · You have severe vaginal bleeding.     · You have severe pain in your belly or pelvis.     · You have had fluid gushing or leaking from your vagina and you know or think the umbilical cord is bulging into your vagina. If this happens, immediately get down on your knees so your rear end (buttocks) is higher than your head. This will decrease the pressure on the cord until help arrives. Call your doctor now or seek immediate medical care if:    · You have signs of preeclampsia, such as:  ? Sudden swelling of your face, hands, or feet. ? New vision problems (such as dimness, blurring, or seeing spots). ? A severe headache.     · You have any vaginal bleeding.     · You have belly pain or cramping.     · You have a fever.     · You have had regular contractions (with or without pain) for an hour.  This means that you have 8 or more within 1 hour or 4 or more in 20 minutes after you change your position and drink fluids.     · You have a sudden release of fluid from your vagina.     · You have low back pain or pelvic pressure that does not go away.     · You notice that your baby has stopped moving or is moving much less than normal.   Watch closely for changes in your health, and be sure to contact your doctor if you have any problems. Where can you learn more? Go to http://www.malhotra.com/  Enter Y951 in the search box to learn more about \"Pregnancy Precautions: Care Instructions. \"  Current as of: June 16, 2021               Content Version: 13.0  © 7916-3752 [employer]. Care instructions adapted under license by [employer] (which disclaims liability or warranty for this information). If you have questions about a medical condition or this instruction, always ask your healthcare professional. [address] any warranty or liability for your use of this information.

## 2022-03-04 NOTE — PROGRESS NOTES
1100 Patient is a ; Maximiano Carota pregnant patient of Dr Edwin Denny. Patient denies leaking fluid, Headache, or dizziness. Patient placed on external fetal heart monitor. 1130 See MAR for Betamethasone administration. 5 Dr Edwin Anthonyist into see patient. [id number] Pt to home undelivered.

## 2022-03-05 NOTE — CONSULTS
[address]   Joaquin Iverson / [id number]  Prenatal Consult  Note Created Date/Time  2022 19:15:49  Note Date  2022  Note Time  16:00:00  MRN  [id number]  [facility]  [id number]  Hospital Name  [address]  [place]  First Name  [address]AR[address] Nelson  Last Name  Bo Anthony of Service   InPatient  Requested By  Chad Calvo  Reason for Consultation  34 6/7 weeks--fetus with lethal congenital cardiac anomaly  Maternal History    1985  Mother's Age  29  Blood Type  O Neg  Mother's Race  White    2  Para  1  RPR Serology  Non-Reactive  HIV  Negative  Rubella  Immune  GBS  Unknown  HBsAg  Negative  Prenatal Care  Yes  EDC OB  2022  Family History  non contributory  Pregnancy Complication(s)  Polyhydramnios, 3rd trimester (O40.3XX0)  Non-immune hydrops fetalis, 3rd trimester (O36.23X0)  Maternal Steroids Yes  Last Dose Date  2022  Next Recent Dose Date  2022  Maternal Medications Yes  Prenatal vitamins  Pregnancy Comment  Known lethal fetal cardiac anomaly, including but not limited to: Heterotaxy with complete AV canal, absent PDA, thickened AV valve and mild to moderate AV valve regurgitation. He has  right atrial isomerism with bilateral SVCs and bilateral right atrial appendages. 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 Family aware of likely poor prognosis including possible fetal demise. Surgical intervention is not a possibility. Dr. Zach Merino ([address]) and [facility] involved. Present Plan:   BMZ course completed 3/4. Parents would like to have drainage of right pleural effusion in DR by Neonatology to see if pleural drainage would improve respiratory effort and cyanosis.   Parents would like to spend as much time as possible with Tami Bravo and provide comfort care including breast feeding or should cardiac anomaly limit feeding ability, to provide NGT feedings for comfort only (not nutritional needs). NO intubation, NO umbilical lines, IV's, cardiac medications. Chest compressions if necessary only to permit drainage of right pleural effusion, but not to continue past attempt at pleural drainage. Parents understand Jose Elias's heart disease is inoperable and want to minimize pain, maximize comfort, and spend as much time with Norleen Daljit as they can. Discussion/Counseling  Met with mother in L&D room 12 on 3/4/2022. BM course completed 3/4. Parents would like to  have drainage of right pleural effusion in DR by Neonatology to see if pleural drainage would improve respiratory effort and cyanosis. Parents would like to spend as much time as possible  with Norleen Daljit and provide comfort care including breast feeding or should cardiac anomaly limit feeding ability, to provide NGT feedings for comfort only (not nutritional needs). NO intubation,  NO umbilical lines, IV's, cardiac medications. Chest compressions if necessary only to permit drainage of right pleural effusion, but not to continue past attempt at pleural drainage. Parents understand Jose Elias's heart disease is inoperable and want to minimize pain, maximize comfort, and spend as much time with Norleen Daljit as they can. Recommendations:  Pleural drainage in DR with BVM and chest compressions only to achieve pleural effusion drainage. If drainage unsuccessful, Jose Elias to go to parents. Should he survive delivery and pleural drainage, Jose Elias to be with his parents in their room. NO invasive procedures including  intubation, line placement, cardiac stimulants (epinephrine). Notify [facility] and Dr. Tigist Jimenez, pediatric cardiology. Loreto Bocanegra may breastfeed as cardiac status allows, but may need NGT  feeds for comfort only (not to meet nutritional needs). Echocardiogram in parents room to  provide visualization of MAPCA's and cardiac anatomy. Neonatology will assist in palliative care and Jarad Marrow to be managed by neonatology service in concert with Robinson and Dr. Misael Gallego until time of discharge should he survive to that point. Thank you for allowing us to meet this joselyn mother and participating in Jose Elias's care. The total length of floor unit time was 60 minute(s). Counseling and/or coordination of care dominated more than fifty percent of the time.   Barbara Almaraz MD  Authenticated by: Barbara Almaraz MD  Date/Time: 03/04/2022 19:55  [facility] - [id number] - [id number]  Prenatal Consult - 03/04/2022 Pg 2 of 2

## 2022-03-07 LAB
ABO + RH BLD: NORMAL
BLD PROD TYP BPU: NORMAL
BLD PROD TYP BPU: NORMAL
BLOOD GROUP ANTIBODIES SERPL: NORMAL
BLOOD GROUP ANTIBODIES SERPL: NORMAL
BPU ID: NORMAL
BPU ID: NORMAL
CROSSMATCH RESULT,%XM: NORMAL
CROSSMATCH RESULT,%XM: NORMAL
SPECIMEN EXP DATE BLD: NORMAL
STATUS OF UNIT,%ST: NORMAL
STATUS OF UNIT,%ST: NORMAL
UNIT DIVISION, %UDIV: 0
UNIT DIVISION, %UDIV: 0

## 2022-03-10 ENCOUNTER — HOME VISIT (OUTPATIENT)
Dept: PALLATIVE CARE | Facility: CLINIC | Age: 37
End: 2022-03-10

## 2022-03-10 DIAGNOSIS — O35.BXX0 FETAL CARDIAC DISEASE AFFECTING PREGNANCY, SINGLE OR UNSPECIFIED FETUS: Primary | ICD-10-CM

## 2022-03-10 NOTE — LETTER
3/15/2022 9:47 AM    Patient:  Brandi Huertas   YOB: 1985  Date of Visit: 3/10/2022      Dear Claudia Lemon DO  Critical access hospital [id number]  Via Fax: [phone]     Yonifernie BlackDO  [address] [place] [id number]  Via In [facility] [address]: Thank you for referring Ms. Yony Rizo to me for evaluation/treatment. Below are the relevant portions of my assessment and plan of care. If you have questions, please do not hesitate to call me. I look forward to following Ms. Kristy Steiner along with you. Phone [phone]   Fax [phone]  [facility] Palliative [facility]    The following discussion has occurred with mother (Jose Armando Tinajero) and father Domonique Calderon) prior to her delivery. The parents recognize that their baby may be born with more or fewer problems than initially anticipated. The following is documentation of a  palliative care discussion of fears, concerns, hopes and wishes, expressed by parents on 3/10/22 regarding the birth of their son, Baylee Cintron, and is not a medical order. 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 He has right atrial isomerism with bilateral SVCs and bilateral right atrial appendages.   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 At her visit with Dr. Delilah Hammer on 1/10/22, Dr. Delilah Hammer shared the complexities of Jose Elias's heart disease and that it would likely require (best case) single ventricle palliation which would be fraught with difficulty. Given the new finding of hydrops fetalis, Dr. Delilah Hammer counseled the family of likely poor prognosis including possible fetal demise. She did not recommend changing the location of her delivery given likely poor prognosis. Rosy and Edy expressed understanding and not wanting Jose Elias to suffer, so Dr. Delilah Hammer recommended referral to [facility] for support and birth planning. Interval History:  Rosy and Tresa Clark are here today to complete birth planning. She saw Dr. Delilah Hammer on 2/28, where a large right-sided pleural effusion was identified. Given Jesica Dodd had made it into the 3rd trimester and Nancy Bryan continues to do well, her anticipation was that if the pleural effusion was drained that Nancy Bryan may be able to survive to be discharge with his parents (this information did not change the ultimate outcome or the lack of good surgical options). Jesica Dodd was admitted on 3/4 due to concern for ?decels and contractions. During that admission, she was able to meet with Dr. Avel Taylor from the NICU to discuss Jose Elias's birth. She discussed at length with the family plans for drainage of the pleural effusion (please see Dr. Adelita Church note dated 3/4/22 for details). In summary, resuscitative measures (ie: compressions and BVM) will be provided if needed to get Jose Elias to drainage of the right-sided pleural effusion in the delivery room. After the drainage has occurred (with local anesthetic being used for pain control), no further resuscitative measures will be performed and comfort care will be provided.   Mom would like to BF if Nancy Bryan will tolerate, and if not, would like an NGT to placed for comfort feedings. Orientation to Labor/Delivery and NICU: yes    Healthcare Team of Patient: Dr. Daria Conrad (OB), Dr. Caro Baker (MFM), Dr. Duane Rideau (Peds Cardiology)    Pregnancy/ Fetus Issues:  OB History    Para Term  AB Living   2 1 1 0 0 1   SAB IAB Ectopic Molar Multiple Live Births   0 0 0 0 0 1       Mother and father's understanding of condition: Both Rosy and Edy understand that Jose Elias's heart defect is inoperable and therefore that he will not ultimately survive. They also understand that it is not clear how long Edilberto Wallace will survive after birth. In light of their understanding, what is most important to mother in anticipation of this baby? Hopes? They hope to spend as much quality time with Edilberto Wallace as possible without him suffering. They do not want to prolong a life that does not have quality. Other children: Lainey Ware, 15 mos  What do they know about the baby? Baby in Mommy's tummy, not much else  What would you like for them to know? Not concerned about what to say now, but more what to say/how to explain in the future. How to keep Edilberto Wallace part of Lary's life. Subsequent referrals made [ name and date ]                Neonatology: see above                : follow-up with Linda Mayer re: concerns about insurance coverage/cost of delivery                 Child Life Specialist: N/A    Spiritual Assessment: Not performed today. Rosa Johns to reach out to Edy to start to discuss  planning    Worries/Fears/Stressors: Lauren Turner did not express any specific concerns outside of ensuring Jose Elias's comfort. Edy expressed concern about possible financial burdens from the hospital care and care moving forward. Communication Preferences/Support/Strengths: Rosy and Edy appreciate honest, straightforward, compassionate, communication    PLAN:    Care during Labor/Delivery: Does mother want:          1. Heartbeat monitored during labor?  N/A-scheduled repeat C/S        2. Epidural or pain meds? N/A-scheduled repeat C/S        3. If baby is in distress, does she want a  section? N/A-having a scheduled C/S        4. Comfort or peaceful items to be brought into Labor Room? N/A C/S        5. Who is desired at bedside:             During Labor? Dad (Edy)             During Delivery? Dad (Edy)             After Delivery? Grandparents, sister Montoya Leader, possible other family members to be decided at a later date        10. Will dad or someone in the family want to cut the cord? no        7. After birth is skin- to- skin holding desired? yes- at delivery if he is stable and/or after the pleural tap        8. Is breastfeeding to be attempted? yes- if Jose Elias tolerates; NGT for comfort feedings if not              Referral to Lactation Consultant [date and name]: not performed yet      Preferences for medical interventions at time of delivery/Considerations for delivery team: Overall, Rosy and Edy would like resuscitative measures as a mechanism to have the pleural effusion drained in the delivery room. After the fluid is drained, they would like comfort care only. Please see Dr. Tammy Huynh note from 3/4/22 for further details of the NICU's discussion and plan. 1. Attempt resuscitation if no signs of life? yes- prior to drainage of the pleural effusion           2. Provide chest compressions? yes- prior to pleural fluid drainge, no compressions to be done after the drainage is complete            3. Supplemental oxygen: Will consider at the time                  4. Nutritional support? yes; BF if able, will consider dropper/bottle feeds; NGT for comfort feeds if oral feedings not possible           5. Any different instructions if:                   Born prematurely? No                   Born with signs of life (heart beating or trying to breath)? No            6. To be taken to NICU for further support?  no      After Delivery Care:              1. Rooming in with Mom? yes            2. Special instructions for Parenting Time? No            3. Faith or cultural wishes after birth? No            4. Request for photos to be taken? yes                 Planning to bring a camera? yes                 Professional voluntary ? yes- Sara Fina to be contacted to take photos after delivery            5. Desire to bathe baby? Maybe- would like to consider                     Special Clothes? Maybe- would like to consider                      Lock of hair? Maybe- would like to consider                      Other mementos desired? Hand/foot prints, baby's ID band and bassinet card      Saying Good-Bye:                1. If an option, would family like to take child home with hospice? yes              2. Is autopsy desired by parents? no              3. Do they want consideration for special genetic studies to be done? Parents to decide after NICU assessment done to see if any further testing would be helpful              4. Any mortuary arrangements made? no- Andree Pen to reach out to Marion Liao to start this discussion. Any special requests or instructions:  None    Thank you for including  palliative care as part of Rosy Frank's  care. Please call our office at [phone] with any questions or concerns. Note routed to:   Dr. Alexa Ibarra and Dr. Hanna Jackson and [address] [place] [address]  Office:  [phone]  Fax: [phone]      NURSING CLINIC VISIT NOTE    Date of Visit: 03/10/22    Diagnosis:    ICD-10-CM ICD-9-CM    1. Fetal cardiac disease affecting pregnancy, single or unspecified fetus  O35. 8XX0 920.75        Nursing Narrative:    Tamarvita Sotelo, her  Edy, and Dr. Carlos Branch for a birth planning conversation in our office at [facility] on Thursday 3/10/22.  Latosha Tam is almost 42 weeks pregnant with a baby boy, Ken Heard, who has been found on ultrasound to have complex cardiac defects. Our birth planning today consisted of discussion around Rosy's care during pregnancy, delivery, and after delivery as well as care for [facility] before and after delivery. We also discussed arrangements for [facility] should he unfortunately pass away shortly after delivery and how the NC team can support the family in arrangements and in memory making and grief support. In the event that [facility] is able to come home with his family we discussed NC availability to treat Jose Elias within our pediatric palliative and hospice program.    See MD note from this visit for full birth plan. MEDICATION MANAGEMENT:  Current Outpatient Medications   Medication Sig Dispense Refill    PNV AK.05/WEAAROJ fum/folic ac (PRENATAL PO) Take 1 Tab by mouth. ACUITY LEVEL:  [] High /  [] Medium  /  [x] Low      ACTION ITEMS:  1. Continue support and education of family  2. Attend clinic visits as requested by family     FOLLOW UP VISIT:  OB appointment Friday 3/11/22  MFM appointment Monday 3/14/22          Thank you for allowing [facility] to participate in this patient and family's care. Please call the [facility] office at [phone] with any questions or concerns.             Sincerely,      Kimi Bran MD

## 2022-03-11 ENCOUNTER — TELEPHONE (OUTPATIENT)
Dept: PALLATIVE CARE | Facility: CLINIC | Age: 37
End: 2022-03-11

## 2022-03-11 ENCOUNTER — OFFICE VISIT (OUTPATIENT)
Dept: PALLATIVE CARE | Facility: CLINIC | Age: 37
End: 2022-03-11

## 2022-03-11 DIAGNOSIS — O35.BXX0 FETAL CARDIAC DISEASE AFFECTING PREGNANCY, SINGLE OR UNSPECIFIED FETUS: Primary | ICD-10-CM

## 2022-03-11 NOTE — PROGRESS NOTES
[facility] [id number]  Office:  [phone]  Fax: [phone]      NURSING CLINIC VISIT NOTE    Date of Visit: 22    Diagnosis:    ICD-10-CM ICD-9-CM    1. Fetal cardiac disease affecting pregnancy, single or unspecified fetus  O35. 8XX0 655.83              Nursing Narrative:  I joined Herberth Mckeon for a visit with OB Dr. Donna Shelton at her office. Herberth Mckeon is not complaining of any pain, but does note that the extra fluid is uncomfortable. She is still feeling relatively well and her blood pressure is good. Jose Elias's heart rate by doppler is 155-160. The [facility] team met with Herberth Castañedamarilyn yesterday for birth planning and we discussed the plans for Jose Elias's delivery. At today's visit we continued the discussion with Dr. Donna Shelton and Herberth Mckeon regarding delivery. Dr. Donna Shelton laid out several scenarios as to how things could go that helped us come up with a plan. 1. If induction is necessary it usually wouldnt be recommended until 39 weeks. We may hear from MFM or Cards that they wish to deliver before that point, and we will discuss that at an [facility] appt on Monday. If this was the case and they proceeded with induction, the risk may be higher due to polyhydramnios and Jose Eliass tendency to move into a breech position frequently. There would be a higher likelihood of stress on the existing scar tissue in the uterus and higher likelihood of risk to Juana Adam if he returns to a breech position. 2. If Herberth Mckeon goes into labor on her own, she could potentially have a , but the risks above would apply. Additionally, her history of having a large infant and the fact that so far her cervix has been long, thick, and closed does not present a favorable situation for vaginal delivery. If she is in labor and/or her water breaks and her cervix and Juana Adam are not in favorable positions, then a  would be advised.  If Juana Adam is vertex and she is dilating and effacing and he is not showing signs of distress then labor can continue. 3. A scheduled  would allow more control of the situation. Multiple c-sections always present a risk of uterine issues with future pregnancies, including placenta previa and accreda, and uterine rupture, but this would only be her second and Dr Ashok Swanson feels that having one or two more c-sections would not be high risk. A scheduled  would also allow for the team to be assembled and ready for not only delivery but also the procedures for Nile Minaya that will take place after delivery. Dr. Ashok Swanson is happy to do a  for Rosy to allow her to meet Nile Minaya and give the team an opportunity to assist him at delivery. Rosy was leaning toward the idea of a scheduled . She feels that this will give Nile Minaya the opportunity to safely arrive and be attended to by the NICU team which may allow for more time with him. If that means she has to recover from a  to spend that time with him, she is willing to go through that recovery. So the real question now is when would we schedule the . She has an M appointment Monday where we will discuss this and they will reach out to Dr. Ashok Swanson after to share their input. MEDICATION MANAGEMENT:  Current Outpatient Medications   Medication Sig Dispense Refill    PNV MU.65/YKOQYOY fum/folic ac (PRENATAL PO) Take 1 Tab by mouth. ACUITY LEVEL:  [] High /  [] Medium  /  [x] Low      ACTION ITEMS:  1. Continue support and education of family  2. Attend clinic visits as requested by family     FOLLOW UP VISIT:  MF appointment  at 0800        Thank you for allowing [facility] to participate in this patient and family's care. Please call the [facility] office at [phone] with any questions or concerns.

## 2022-03-11 NOTE — TELEPHONE ENCOUNTER
DURAN reached out to NICU discharge planner in regards to cost of pleurocentesis as family prepares for what hospital costs may be. DURAN was referred to Esequiel Zarate in billing at [phone]. DURAN left a message with Ms. Kathia Hendrickson today and will f/u with family with any new information.

## 2022-03-14 ENCOUNTER — OFFICE VISIT (OUTPATIENT)
Dept: PALLATIVE CARE | Facility: CLINIC | Age: 37
End: 2022-03-14

## 2022-03-14 DIAGNOSIS — O35.BXX0 FETAL CARDIAC DISEASE AFFECTING PREGNANCY, SINGLE OR UNSPECIFIED FETUS: Primary | ICD-10-CM

## 2022-03-14 DIAGNOSIS — Z3A.36 36 WEEKS GESTATION OF PREGNANCY: ICD-10-CM

## 2022-03-14 NOTE — PROGRESS NOTES
[facility] [id number]  Office:  [phone]  Fax: [phone]      NURSING CLINIC VISIT NOTE    Date of Visit: 22    Diagnosis:    ICD-10-CM ICD-9-CM    1. Fetal cardiac disease affecting pregnancy, single or unspecified fetus  O35. 8XX0 655.83    2. 36 weeks gestation of pregnancy  Z3A.36 V22.2              Nursing Narrative:  Thor Duverney for a 36 week 2 day visit with maternal fetal medicine, Dr. Molina Akbar. Fluid measurement is slightly up from last Thursday's visit, and Dr. Molina Akbar feels this is likely due to reduced swallowing by Rhea Olsen in utero due to pulmonary effusion pressure on the esophagus. Leo Nieves says in the evenings she gets much more tired, breathless, and uncomfortable but is not having any pain at this time. Today's discussion revolved around when to begin planning for delivery. At her [facility] appointment with Dr. Kassi Liao on Friday we discussed a planned  as the best way to deliver Rhea Olsen safely, promote Rosy's comfort, and give Rhea Jimenezer the best chance at survival with NICU intervention at the bedside in the delivery room. Dr. Molina Akbar is in agreement and will reach out to Dr. Kassi Liao to discuss. He recommends this coming Thursday or Friday for delivery, 3/17 or 3/18, or Monday 3/21. All dates are right around the 37 week point. Rosy's preference is for Thursday or Friday, but wants what is best for Janak Hood looked healthy and active on today's NST and ultrasound, moving, breathing, and tolerating occasional contractions well. I will continue to coordinate getting a birth plan to [address] [place] [address] L&D and work with Leo Nieves to get a list of possible visitors she would want nearby for Jose Elias's delivery. MEDICATION MANAGEMENT:  Current Outpatient Medications   Medication Sig Dispense Refill    PNV VK.61/LBKWQKS fum/folic ac (PRENATAL PO) Take 1 Tab by mouth.          ACUITY LEVEL:  [] High /  [] Medium  /  [x] Low      ACTION ITEMS:  1. Continue support and education of family  2. Attend clinic visits as requested by family     FOLLOW UP VISIT:    Thursday MFM appt or delivery          Thank you for allowing [facility] to participate in this patient and family's care. Please call the [facility] office at [phone] with any questions or concerns.

## 2022-03-14 NOTE — PROGRESS NOTES
Phone [phone]   Fax [phone]  [facility] Pediatric Palliative and Hospice Care    The following discussion has occurred with mother (Pedro Cranker) and father Gearvincent Mason) prior to her delivery. The parents recognize that their baby may be born with more or fewer problems than initially anticipated. The following is documentation of a  palliative care discussion of fears, concerns, hopes and wishes, expressed by parents on 3/10/22 regarding the birth of their son, Flori Benavides, and is not a medical order. 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 He has right atrial isomerism with bilateral SVCs and bilateral right atrial appendages. 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 At her visit with Dr. Alem Sandoval on 1/10/22, Dr. Alem Sandoval shared the complexities of Jose Elias's heart disease and that it would likely require (best case) single ventricle palliation which would be fraught with difficulty. Given the new finding of hydrops fetalis, Dr. Alem Sandoval counseled the family of likely poor prognosis including possible fetal demise.   She did not recommend changing the location of her delivery given likely poor prognosis. Rosy and Edy expressed understanding and not wanting Jose Elias to suffer, so Dr. Jacob Savage recommended referral to [facility] for support and birth planning. Interval History:  Kenton Almeida are here today to complete birth planning. She saw Dr. Jacob Savage on , where a large right-sided pleural effusion was identified. Given Clemente Reeder had made it into the 3rd trimester and Whitney Almaraz continues to do well, her anticipation was that if the pleural effusion was drained that Whitney Almaraz may be able to survive to be discharge with his parents (this information did not change the ultimate outcome or the lack of good surgical options). Clemente Reeder was admitted on 3/4 due to concern for ?decels and contractions. During that admission, she was able to meet with Dr. Violet Murray from the NICU to discuss Jose Elias's birth. She discussed at length with the family plans for drainage of the pleural effusion (please see Dr. Lyubov Melendez note dated 3/4/22 for details). In summary, resuscitative measures (ie: compressions and BVM) will be provided if needed to get Jose Elias to drainage of the right-sided pleural effusion in the delivery room. After the drainage has occurred (with local anesthetic being used for pain control), no further resuscitative measures will be performed and comfort care will be provided. Mom would like to BF if Whitney Almaraz will tolerate, and if not, would like an NGT to placed for comfort feedings. Orientation to Labor/Delivery and NICU: yes    Healthcare Team of Patient: Dr. Triston Palacios (OB), Dr. Miladys Wilks (MFM), Dr. Jacob Savage (Peds Cardiology)    Pregnancy/ Fetus Issues:  OB History    Para Term  AB Living   2 1 1 0 0 1   SAB IAB Ectopic Molar Multiple Live Births   0 0 0 0 0 1       Mother and father's understanding of condition: Both Rosy and Edy understand that Jose Elias's heart defect is inoperable and therefore that he will not ultimately survive.   They also understand that it is not clear how long Whitney Almaraz will survive after birth. In light of their understanding, what is most important to mother in anticipation of this baby? Hopes? They hope to spend as much quality time with Tami Bravo as possible without him suffering. They do not want to prolong a life that does not have quality. Other children: Marcela Garcia, 15 mos  What do they know about the baby? Baby in Mommy's tummy, not much else  What would you like for them to know? Not concerned about what to say now, but more what to say/how to explain in the future. How to keep Tami Bravo part of Lary's life. Subsequent referrals made [ name and date ]                Neonatology: see above                : follow-up with Sheila Chappell re: concerns about insurance coverage/cost of delivery                 Child Life Specialist: N/A    Spiritual Assessment: Not performed today. Jose Castro to reach out to Edy to start to discuss  planning    Worries/Fears/Stressors: Jessica Lilly did not express any specific concerns outside of ensuring Jose Elias's comfort. Edy expressed concern about possible financial burdens from the hospital care and care moving forward. Communication Preferences/Support/Strengths: Rosy and Edy appreciate honest, straightforward, compassionate, communication    PLAN:    Care during Labor/Delivery: Does mother want:          1. Heartbeat monitored during labor? N/A-scheduled repeat C/S        2. Epidural or pain meds? N/A-scheduled repeat C/S        3. If baby is in distress, does she want a  section? N/A-having a scheduled C/S        4. Comfort or peaceful items to be brought into Labor Room? N/A C/S        5. Who is desired at bedside:             During Labor? Dad (Edy)             During Delivery? Dad (Edy)             After Delivery? Grandparents, sister Marcela Garcia, possible other family members to be decided at a later date        10. Will dad or someone in the family want to cut the cord? no        7.  After birth is skin- to- skin holding desired? yes- at delivery if he is stable and/or after the pleural tap        8. Is breastfeeding to be attempted? yes- if Jose Elias tolerates; NGT for comfort feedings if not              Referral to Lactation Consultant [date and name]: not performed yet      Preferences for medical interventions at time of delivery/Considerations for delivery team: Overall, Rosy and Edy would like resuscitative measures as a mechanism to have the pleural effusion drained in the delivery room. After the fluid is drained, they would like comfort care only. Please see Dr. Sanjana Moses note from 3/4/22 for further details of the NICU's discussion and plan. 1. Attempt resuscitation if no signs of life? yes- prior to drainage of the pleural effusion           2. Provide chest compressions? yes- prior to pleural fluid drainge, no compressions to be done after the drainage is complete            3. Supplemental oxygen: Will consider at the time                  4. Nutritional support? yes; BF if able, will consider dropper/bottle feeds; NGT for comfort feeds if oral feedings not possible           5. Any different instructions if:                   Born prematurely? No                   Born with signs of life (heart beating or trying to breath)? No            6. To be taken to NICU for further support? no      After Delivery Care:              1. Rooming in with Mom? yes            2. Special instructions for Parenting Time? No            3. Rastafarian or cultural wishes after birth? No            4. Request for photos to be taken? yes                 Planning to bring a camera? yes                 Professional voluntary ? yes- Tiff Mccormack to be contacted to take photos after delivery            5. Desire to bathe baby? Maybe- would like to consider                     Special Clothes? Maybe- would like to consider                      Lock of hair?  Maybe- would like to consider Other mementos desired? Hand/foot prints, baby's ID band and bassinet card      Saying Good-Bye:                1. If an option, would family like to take child home with hospice? yes              2. Is autopsy desired by parents? no              3. Do they want consideration for special genetic studies to be done? Parents to decide after NICU assessment done to see if any further testing would be helpful              4. Any mortuary arrangements made? no- Marlene Sanchez to reach out to Papi Lyons to start this discussion. Any special requests or instructions:  None    Thank you for including  palliative care as part of Rosy Marie's  care. Please call our office at [phone] with any questions or concerns.       Note routed to:   Dr. Edwin Denny and Dr. La Murray

## 2022-03-14 NOTE — PROGRESS NOTES
[facility] [id number]  Office:  [phone]  Fax: [phone]      NURSING CLINIC VISIT NOTE    Date of Visit: 03/10/22    Diagnosis:    ICD-10-CM ICD-9-CM    1. Fetal cardiac disease affecting pregnancy, single or unspecified fetus  O35. 8XX0 226.60        Nursing Narrative:    Sriram Resendiz, her  Edy, and Dr. Maida Hendricks for a birth planning conversation in our office at [facility] on Thursday 3/10/22. Jessica Carr is almost 42 weeks pregnant with a baby boy, Tami Bravo, who has been found on ultrasound to have complex cardiac defects. Our birth planning today consisted of discussion around Rosy's care during pregnancy, delivery, and after delivery as well as care for Tami Bravo before and after delivery. We also discussed arrangements for Tami Bravo should he unfortunately pass away shortly after delivery and how the NC team can support the family in arrangements and in memory making and grief support. In the event that Tami Bravo is able to come home with his family we discussed NC availability to treat Jose Elias within our pediatric palliative and hospice program.    See MD note from this visit for full birth plan. MEDICATION MANAGEMENT:  Current Outpatient Medications   Medication Sig Dispense Refill    PNV DB.35/MSNAVEI fum/folic ac (PRENATAL PO) Take 1 Tab by mouth. ACUITY LEVEL:  [] High /  [] Medium  /  [x] Low      ACTION ITEMS:  1. Continue support and education of family  2. Attend clinic visits as requested by family     FOLLOW UP VISIT:  OB appointment Friday 3/11/22  MFM appointment Monday 3/14/22          Thank you for allowing [facility] to participate in this patient and family's care. Please call the [facility] office at [phone] with any questions or concerns.

## 2022-03-15 PROBLEM — Z3A.40 40 WEEKS GESTATION OF PREGNANCY: Status: RESOLVED | Noted: 2020-11-19 | Resolved: 2022-03-15

## 2022-03-15 NOTE — PATIENT INSTRUCTIONS
It was a pleasure seeing you and Claudette Erb for a birth planning visit on 3/10/22. At our visit we discussed: Your stated goals: Maximize time with Jose Elias while minimizing suffering. This is the plan we talked about:     1. Birth plan completed today and will be sent to all involved parties. 2. Georgianna Spurling RN to attend [facility] visit tomorrow. 2. Jailyn Mathew to reach out to Kwasi Ryan to start the discussion of  arrangements  3. Arabella Cardoso to reach out to Edy to discuss financial/insurance concerns. This is what you have shared with us about Ruby Ríos. Planning 3/4/2022   Confirm Advance Directive None   Patient Would Like to Complete Advance Directive No         The [facility] pediatric palliative care team is here to support you and your family. We will see you again at your next [facility] appointment. Please let us know if you need to reschedule or be seen sooner by calling our office at [phone]. Sincerely,    Dawn Barros.  Rafa Pena MD and the [facility]

## 2022-03-21 ENCOUNTER — ANESTHESIA (OUTPATIENT)
Dept: LABOR AND DELIVERY | Age: 37
End: 2022-03-21
Payer: COMMERCIAL

## 2022-03-21 ENCOUNTER — ANESTHESIA EVENT (OUTPATIENT)
Dept: LABOR AND DELIVERY | Age: 37
End: 2022-03-21
Payer: COMMERCIAL

## 2022-03-21 ENCOUNTER — HOSPITAL ENCOUNTER (INPATIENT)
Age: 37
LOS: 2 days | Discharge: HOME | End: 2022-03-23
Attending: OBSTETRICS & GYNECOLOGY | Admitting: OBSTETRICS & GYNECOLOGY
Payer: COMMERCIAL

## 2022-03-21 PROBLEM — O35.BXX0 FETAL CARDIAC ANOMALY AFFECTING PREGNANCY, ANTEPARTUM: Status: ACTIVE | Noted: 2022-03-21

## 2022-03-21 PROBLEM — Z98.891 HISTORY OF CESAREAN DELIVERY: Status: ACTIVE | Noted: 2022-03-21

## 2022-03-21 LAB
ERYTHROCYTE [DISTWIDTH] IN BLOOD BY AUTOMATED COUNT: 12.1 % (ref 11.5–14.5)
HCT VFR BLD AUTO: 31.5 % (ref 35–47)
HGB BLD-MCNC: 10.5 G/DL (ref 11.5–16)
MCH RBC QN AUTO: 30.2 PG (ref 26–34)
MCHC RBC AUTO-ENTMCNC: 33.3 G/DL (ref 30–36.5)
MCV RBC AUTO: 90.5 FL (ref 80–99)
NRBC # BLD: 0 K/UL (ref 0–0.01)
NRBC BLD-RTO: 0 PER 100 WBC
PLATELET # BLD AUTO: 179 K/UL (ref 150–400)
PMV BLD AUTO: 11.1 FL (ref 8.9–12.9)
RBC # BLD AUTO: 3.48 M/UL (ref 3.8–5.2)
WBC # BLD AUTO: 6.3 K/UL (ref 3.6–11)

## 2022-03-21 PROCEDURE — 85027 COMPLETE CBC AUTOMATED: CPT

## 2022-03-21 PROCEDURE — 77030007866 HC KT SPN ANES BBMI -B: Performed by: ANESTHESIOLOGY

## 2022-03-21 PROCEDURE — 74011250636 HC RX REV CODE- 250/636: Performed by: OBSTETRICS & GYNECOLOGY

## 2022-03-21 PROCEDURE — 74011000250 HC RX REV CODE- 250: Performed by: OBSTETRICS & GYNECOLOGY

## 2022-03-21 PROCEDURE — 75410000003 HC RECOV DEL/VAG/CSECN EA 0.5 HR: Performed by: OBSTETRICS & GYNECOLOGY

## 2022-03-21 PROCEDURE — 36415 COLL VENOUS BLD VENIPUNCTURE: CPT

## 2022-03-21 PROCEDURE — 86921 COMPATIBILITY TEST INCUBATE: CPT

## 2022-03-21 PROCEDURE — 86870 RBC ANTIBODY IDENTIFICATION: CPT

## 2022-03-21 PROCEDURE — 65410000002 HC RM PRIVATE OB

## 2022-03-21 PROCEDURE — 74011250636 HC RX REV CODE- 250/636: Performed by: ANESTHESIOLOGY

## 2022-03-21 PROCEDURE — 86644 CMV ANTIBODY: CPT

## 2022-03-21 PROCEDURE — 74011000250 HC RX REV CODE- 250: Performed by: ANESTHESIOLOGY

## 2022-03-21 PROCEDURE — 76010000392 HC C SECN EA ADDL 0.5 HR: Performed by: OBSTETRICS & GYNECOLOGY

## 2022-03-21 PROCEDURE — 86900 BLOOD TYPING SEROLOGIC ABO: CPT

## 2022-03-21 PROCEDURE — 86920 COMPATIBILITY TEST SPIN: CPT

## 2022-03-21 PROCEDURE — 86922 COMPATIBILITY TEST ANTIGLOB: CPT

## 2022-03-21 PROCEDURE — 76060000078 HC EPIDURAL ANESTHESIA: Performed by: OBSTETRICS & GYNECOLOGY

## 2022-03-21 PROCEDURE — 76010000391 HC C SECN FIRST 1 HR: Performed by: OBSTETRICS & GYNECOLOGY

## 2022-03-21 RX ORDER — SODIUM CHLORIDE 0.9 % (FLUSH) 0.9 %
5-40 SYRINGE (ML) INJECTION AS NEEDED
Status: DISCONTINUED | OUTPATIENT
Start: 2022-03-21 | End: 2022-03-23 | Stop reason: HOSPADM

## 2022-03-21 RX ORDER — HYDROCODONE BITARTRATE AND ACETAMINOPHEN 5; 325 MG/1; MG/1
1 TABLET ORAL
Status: DISCONTINUED | OUTPATIENT
Start: 2022-03-21 | End: 2022-03-23 | Stop reason: HOSPADM

## 2022-03-21 RX ORDER — IBUPROFEN 400 MG/1
800 TABLET ORAL EVERY 8 HOURS
Status: DISCONTINUED | OUTPATIENT
Start: 2022-03-21 | End: 2022-03-23 | Stop reason: HOSPADM

## 2022-03-21 RX ORDER — ACETAMINOPHEN 325 MG/1
650 TABLET ORAL
Status: DISCONTINUED | OUTPATIENT
Start: 2022-03-21 | End: 2022-03-23 | Stop reason: HOSPADM

## 2022-03-21 RX ORDER — AMMONIA 15 % (W/V)
1 AMPUL (EA) INHALATION AS NEEDED
Status: DISCONTINUED | OUTPATIENT
Start: 2022-03-21 | End: 2022-03-23 | Stop reason: HOSPADM

## 2022-03-21 RX ORDER — KETOROLAC TROMETHAMINE 30 MG/ML
INJECTION, SOLUTION INTRAMUSCULAR; INTRAVENOUS AS NEEDED
Status: DISCONTINUED | OUTPATIENT
Start: 2022-03-21 | End: 2022-03-21 | Stop reason: HOSPADM

## 2022-03-21 RX ORDER — ONDANSETRON 2 MG/ML
4 INJECTION INTRAMUSCULAR; INTRAVENOUS
Status: ACTIVE | OUTPATIENT
Start: 2022-03-21 | End: 2022-03-22

## 2022-03-21 RX ORDER — HYDROCORTISONE 1 %
CREAM (GRAM) TOPICAL AS NEEDED
Status: DISCONTINUED | OUTPATIENT
Start: 2022-03-21 | End: 2022-03-23 | Stop reason: HOSPADM

## 2022-03-21 RX ORDER — SODIUM CHLORIDE, SODIUM LACTATE, POTASSIUM CHLORIDE, CALCIUM CHLORIDE 600; 310; 30; 20 MG/100ML; MG/100ML; MG/100ML; MG/100ML
INJECTION, SOLUTION INTRAVENOUS
Status: DISCONTINUED | OUTPATIENT
Start: 2022-03-21 | End: 2022-03-21 | Stop reason: HOSPADM

## 2022-03-21 RX ORDER — DIPHENHYDRAMINE HYDROCHLORIDE 50 MG/ML
12.5 INJECTION, SOLUTION INTRAMUSCULAR; INTRAVENOUS ONCE
Status: ACTIVE | OUTPATIENT
Start: 2022-03-21 | End: 2022-03-22

## 2022-03-21 RX ORDER — DIPHENHYDRAMINE HYDROCHLORIDE 50 MG/ML
12.5 INJECTION, SOLUTION INTRAMUSCULAR; INTRAVENOUS
Status: DISCONTINUED | OUTPATIENT
Start: 2022-03-21 | End: 2022-03-23 | Stop reason: HOSPADM

## 2022-03-21 RX ORDER — OXYTOCIN/RINGER'S LACTATE 30/500 ML
87.3 PLASTIC BAG, INJECTION (ML) INTRAVENOUS AS NEEDED
Status: DISCONTINUED | OUTPATIENT
Start: 2022-03-21 | End: 2022-03-21 | Stop reason: HOSPADM

## 2022-03-21 RX ORDER — KETOROLAC TROMETHAMINE 30 MG/ML
30 INJECTION, SOLUTION INTRAMUSCULAR; INTRAVENOUS
Status: DISPENSED | OUTPATIENT
Start: 2022-03-21 | End: 2022-03-22

## 2022-03-21 RX ORDER — BUPIVACAINE HYDROCHLORIDE 7.5 MG/ML
INJECTION, SOLUTION EPIDURAL; RETROBULBAR
Status: COMPLETED | OUTPATIENT
Start: 2022-03-21 | End: 2022-03-21

## 2022-03-21 RX ORDER — MORPHINE SULFATE 10 MG/ML
6 INJECTION, SOLUTION INTRAMUSCULAR; INTRAVENOUS
Status: DISPENSED | OUTPATIENT
Start: 2022-03-21 | End: 2022-03-22

## 2022-03-21 RX ORDER — OXYTOCIN/RINGER'S LACTATE 30/500 ML
PLASTIC BAG, INJECTION (ML) INTRAVENOUS
Status: DISCONTINUED | OUTPATIENT
Start: 2022-03-21 | End: 2022-03-21 | Stop reason: HOSPADM

## 2022-03-21 RX ORDER — NALOXONE HYDROCHLORIDE 0.4 MG/ML
0.4 INJECTION, SOLUTION INTRAMUSCULAR; INTRAVENOUS; SUBCUTANEOUS AS NEEDED
Status: DISCONTINUED | OUTPATIENT
Start: 2022-03-21 | End: 2022-03-23 | Stop reason: HOSPADM

## 2022-03-21 RX ORDER — OXYTOCIN/RINGER'S LACTATE 30/500 ML
PLASTIC BAG, INJECTION (ML) INTRAVENOUS
Status: COMPLETED
Start: 2022-03-21 | End: 2022-03-21

## 2022-03-21 RX ORDER — OXYTOCIN/RINGER'S LACTATE 30/500 ML
10 PLASTIC BAG, INJECTION (ML) INTRAVENOUS AS NEEDED
Status: DISCONTINUED | OUTPATIENT
Start: 2022-03-21 | End: 2022-03-23 | Stop reason: HOSPADM

## 2022-03-21 RX ORDER — SODIUM CHLORIDE, SODIUM LACTATE, POTASSIUM CHLORIDE, CALCIUM CHLORIDE 600; 310; 30; 20 MG/100ML; MG/100ML; MG/100ML; MG/100ML
125 INJECTION, SOLUTION INTRAVENOUS CONTINUOUS
Status: DISCONTINUED | OUTPATIENT
Start: 2022-03-21 | End: 2022-03-23 | Stop reason: HOSPADM

## 2022-03-21 RX ORDER — SIMETHICONE 80 MG
80 TABLET,CHEWABLE ORAL
Status: DISCONTINUED | OUTPATIENT
Start: 2022-03-21 | End: 2022-03-23 | Stop reason: HOSPADM

## 2022-03-21 RX ORDER — MORPHINE SULFATE 10 MG/ML
10 INJECTION, SOLUTION INTRAMUSCULAR; INTRAVENOUS
Status: DISPENSED | OUTPATIENT
Start: 2022-03-21 | End: 2022-03-22

## 2022-03-21 RX ORDER — ONDANSETRON 2 MG/ML
INJECTION INTRAMUSCULAR; INTRAVENOUS AS NEEDED
Status: DISCONTINUED | OUTPATIENT
Start: 2022-03-21 | End: 2022-03-21 | Stop reason: HOSPADM

## 2022-03-21 RX ORDER — SODIUM CHLORIDE 0.9 % (FLUSH) 0.9 %
5-40 SYRINGE (ML) INJECTION EVERY 8 HOURS
Status: DISCONTINUED | OUTPATIENT
Start: 2022-03-21 | End: 2022-03-23 | Stop reason: HOSPADM

## 2022-03-21 RX ORDER — ONDANSETRON 2 MG/ML
4 INJECTION INTRAMUSCULAR; INTRAVENOUS
Status: DISCONTINUED | OUTPATIENT
Start: 2022-03-22 | End: 2022-03-23 | Stop reason: HOSPADM

## 2022-03-21 RX ORDER — DEXAMETHASONE SODIUM PHOSPHATE 4 MG/ML
INJECTION, SOLUTION INTRA-ARTICULAR; INTRALESIONAL; INTRAMUSCULAR; INTRAVENOUS; SOFT TISSUE AS NEEDED
Status: DISCONTINUED | OUTPATIENT
Start: 2022-03-21 | End: 2022-03-21 | Stop reason: HOSPADM

## 2022-03-21 RX ORDER — DOCUSATE SODIUM 100 MG/1
100 CAPSULE, LIQUID FILLED ORAL
Status: DISCONTINUED | OUTPATIENT
Start: 2022-03-21 | End: 2022-03-23 | Stop reason: HOSPADM

## 2022-03-21 RX ORDER — MORPHINE SULFATE 0.5 MG/ML
INJECTION, SOLUTION EPIDURAL; INTRATHECAL; INTRAVENOUS
Status: COMPLETED | OUTPATIENT
Start: 2022-03-21 | End: 2022-03-21

## 2022-03-21 RX ORDER — OXYTOCIN/RINGER'S LACTATE 30/500 ML
87.3 PLASTIC BAG, INJECTION (ML) INTRAVENOUS AS NEEDED
Status: DISCONTINUED | OUTPATIENT
Start: 2022-03-21 | End: 2022-03-23 | Stop reason: HOSPADM

## 2022-03-21 RX ORDER — OXYTOCIN/RINGER'S LACTATE 30/500 ML
10 PLASTIC BAG, INJECTION (ML) INTRAVENOUS AS NEEDED
Status: DISCONTINUED | OUTPATIENT
Start: 2022-03-21 | End: 2022-03-21 | Stop reason: HOSPADM

## 2022-03-21 RX ORDER — HYDROCODONE BITARTRATE AND ACETAMINOPHEN 7.5; 325 MG/1; MG/1
1 TABLET ORAL
Status: DISCONTINUED | OUTPATIENT
Start: 2022-03-21 | End: 2022-03-23 | Stop reason: HOSPADM

## 2022-03-21 RX ORDER — FENTANYL CITRATE 50 UG/ML
INJECTION, SOLUTION INTRAMUSCULAR; INTRAVENOUS
Status: COMPLETED | OUTPATIENT
Start: 2022-03-21 | End: 2022-03-21

## 2022-03-21 RX ADMIN — ONDANSETRON HYDROCHLORIDE 4 MG: 2 INJECTION, SOLUTION INTRAMUSCULAR; INTRAVENOUS at 12:23

## 2022-03-21 RX ADMIN — Medication 0.15 MG: at 12:16

## 2022-03-21 RX ADMIN — SODIUM CHLORIDE, POTASSIUM CHLORIDE, SODIUM LACTATE AND CALCIUM CHLORIDE 1000 ML: 600; 310; 30; 20 INJECTION, SOLUTION INTRAVENOUS at 11:00

## 2022-03-21 RX ADMIN — BUPIVACAINE HYDROCHLORIDE 12 MG: 7.5 INJECTION, SOLUTION EPIDURAL; RETROBULBAR at 12:16

## 2022-03-21 RX ADMIN — KETOROLAC TROMETHAMINE 30 MG: 30 INJECTION, SOLUTION INTRAMUSCULAR; INTRAVENOUS at 18:50

## 2022-03-21 RX ADMIN — SODIUM CHLORIDE 30 MCG/MIN: 9 INJECTION, SOLUTION INTRAVENOUS at 12:16

## 2022-03-21 RX ADMIN — FENTANYL CITRATE 15 MCG: 50 INJECTION, SOLUTION INTRAMUSCULAR; INTRAVENOUS at 12:16

## 2022-03-21 RX ADMIN — WATER 2 G: 1 INJECTION INTRAMUSCULAR; INTRAVENOUS; SUBCUTANEOUS at 12:19

## 2022-03-21 RX ADMIN — Medication 909 ML/HR: at 12:40

## 2022-03-21 RX ADMIN — DEXAMETHASONE SODIUM PHOSPHATE 4 MG: 4 INJECTION, SOLUTION INTRAMUSCULAR; INTRAVENOUS at 12:23

## 2022-03-21 RX ADMIN — SODIUM CHLORIDE, POTASSIUM CHLORIDE, SODIUM LACTATE AND CALCIUM CHLORIDE: 600; 310; 30; 20 INJECTION, SOLUTION INTRAVENOUS at 12:10

## 2022-03-21 RX ADMIN — KETOROLAC TROMETHAMINE 30 MG: 30 INJECTION, SOLUTION INTRAMUSCULAR; INTRAVENOUS at 13:03

## 2022-03-21 NOTE — ANESTHESIA PREPROCEDURE EVALUATION
Relevant Problems   No relevant active problems       Anesthetic History   No history of anesthetic complications            Review of Systems / Medical History  Patient summary reviewed, nursing notes reviewed and pertinent labs reviewed    Pulmonary  Within defined limits                 Neuro/Psych   Within defined limits           Cardiovascular  Within defined limits                Exercise tolerance: >4 METS     GI/Hepatic/Renal  Within defined limits              Endo/Other  Within defined limits           Other Findings              Physical Exam    Airway  Mallampati: II  TM Distance: 4 - 6 cm  Neck ROM: normal range of motion   Mouth opening: Normal     Cardiovascular    Rhythm: regular  Rate: normal         Dental  No notable dental hx       Pulmonary  Breath sounds clear to auscultation               Abdominal  Abdominal exam normal       Other Findings            Anesthetic Plan    ASA: 2  Anesthesia type: spinal      Post-op pain plan if not by surgeon: intrathecal opiates      Anesthetic plan and risks discussed with: Patient and Family

## 2022-03-21 NOTE — PROGRESS NOTES
Spiritual Care Assessment/Progress Note  [facility]      NAME: Jackie Silvestre      MRN: [id number]  AGE: 31 y.o. SEX: female  Restorationist Affiliation: eGnnaro Most   Language: English     3/21/2022     Total Time (in minutes): 10     Spiritual Assessment begun in [address] through conversation with:         []Patient        [] Family    [] Friend(s)        Reason for Consult: Death,  loss     Spiritual beliefs: (Please include comment if needed)     [] Identifies with a maikel tradition:         [] Supported by a maikel community:            [] Claims no spiritual orientation:           [] Seeking spiritual identity:                [] Adheres to an individual form of spirituality:           [x] Not able to assess: Sabianism, per chart                        Identified resources for coping:      [] Prayer                               [] Music                  [] Guided Imagery     [] Family/friends                 [] Pet visits     [] Devotional reading                         [] Unknown     [] Other                                           Interventions offered during this visit: (See comments for more details)    Patient Interventions: Initial visit           Plan of Care:     [] Support spiritual and/or cultural needs    [] Support AMD and/or advance care planning process      [] Support grieving process   [] Coordinate Rites and/or Rituals    [] Coordination with community clergy   [] No spiritual needs identified at this time   [] Detailed Plan of Care below (See Comments)  [] Make referral to Music Therapy  [] Make referral to Pet Therapy     [] Make referral to Addiction services  [] Make referral to [facility]  [] Make referral to Spiritual Care Partner  [] No future visits requested        [x] Contact Spiritual Care for further referrals     Comments:  paged an informed of fetal demise. Checked in with nursing staff upon arrival to unit.   Ms. Inocencia Roman and family declined  visit at this time, indicating that they have support from another family member who is clergy. Chaplains remain available upon further referral if any additional needs arise.     Sindi De León MDiv, [facility]

## 2022-03-21 NOTE — ANESTHESIA PROCEDURE NOTES
Spinal Block    Start time: 3/21/2022 12:16 PM  Performed by: Stella Marshall MD  Authorized by: Stella Marshall MD     Pre-procedure:   Indications: primary anesthetic  Preanesthetic Checklist: patient identified, risks and benefits discussed, site marked, patient being monitored and timeout performed    Timeout Time: 12:16 EDT          Spinal Block:   Patient Position:  Seated  Prep Region:  Lumbar  Prep: chlorhexidine and patient draped      Location:  L3-4  Technique:  Single shot    Local Dose (mL):  1.6    Needle:   Needle Type:  Pencil-tip  Needle Gauge:  25 G  Attempts:  1      Events: CSF confirmed, no blood with aspiration and no paresthesia        Assessment:  Insertion:  Uncomplicated  Patient tolerance:  Patient tolerated the procedure well with no immediate complications

## 2022-03-21 NOTE — H&P
History & Physical    Name: Hernandez Jenkins MRN: [id number]  SSN: [id number]    YOB: 1985  Age: 31 y.o. Sex: female      Subjective:     Estimated Date of Delivery: 22  OB History    Para Term  AB Living   2 1 1 0 0 1   SAB IAB Ectopic Molar Multiple Live Births   0 0 0 0 0 1      # Outcome Date GA Lbr Jordi/2nd Weight Sex Delivery Anes PTL Lv   2 Current            1 Term 20 40w3d 11:35 / 03:33 4.27 kg F CS-LTranv EPI N KARIN       Ms. Vanita Downey admitted with pregnancy at [facility] for  section due to prior , polyhydramnios with unstable lie and unfavorable cervix. 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 Keily Lucero Please see prenatal records for details. Past Medical History:   Diagnosis Date    Fetal cardiac anomaly affecting pregnancy, antepartum 3/21/2022     Past Surgical History:   Procedure Laterality Date    HX  SECTION      delivered  2020    HX OTHER SURGICAL      knee surgery      Social History     Occupational History    Not on file   Tobacco Use    Smoking status: Never Smoker    Smokeless tobacco: Never Used   Vaping Use    Vaping Use: Never used   Substance and Sexual Activity    Alcohol use: Not Currently    Drug use: Never    Sexual activity: Not on file     Family History   Problem Relation Age of Onset    Diabetes Father     Cancer Sister     Cancer Paternal Grandfather     Diabetes Paternal Grandfather        No Known Allergies  Prior to Admission medications    Medication Sig Start Date End Date Taking? Authorizing Provider   RAYMOND WZ.28/DLEOTAW fum/folic ac (PRENATAL PO) Take 1 Tab by mouth. Yes Provider, Historical        Review of Systems: Pertinent items are noted in the History of Present Illness.     Objective:     Vitals:  Vitals: 22 1001 22 1040   BP: 135/84    Pulse: 99    Resp: 16    Temp: 97.5 °F (36.4 °C)    SpO2: 99%    Weight:  83.5 kg (184 lb)   Height:  5' 7\" (1.702 m)        Physical Exam:  Patient without distress. Heart: Regular rate and rhythm  Lung: clear to auscultation throughout lung fields, no wheezes, no rales, no rhonchi and normal respiratory effort  Abdomen: soft, nontender, gravid  Lower Extremities:  - Edema trace  Membranes:  Intact  Pt declined heart tones    Prenatal Labs:   Lab Results   Component Value Date/Time    ABO/Rh(D) O NEGATIVE 2022 10:23 AM    Rubella, External immune  2020 12:00 AM    GrBStrep, External positive  10/16/2020 12:00 AM    HBsAg, External negative  2020 12:00 AM    HIV, External non reactive  2020 12:00 AM    ABO,Rh O negative  2020 12:00 AM         Impression/Plan:     Plan:  Admit for  section. Group B Strep was positive in urine. Discussed the risks of surgery including the risks of bleeding, infection, deep vein thrombosis, and surgical injuries to internal organs including but not limited to the bowels, bladder, rectum, and female reproductive organs. The patient understands the risks; any and all questions were answered to the patient's satisfaction. Pt still desires to proceed with  for delivery. They are aware of grim prognosis of baby. The plan is for pleurocentesis for solitary pleural effusion to relieve some respiratory compromise and allow time with mom and dad. All of this has been discussed in great detail with her peds cardiologist, Dr Alem Sandoval and the NICU team. Appreciate everyone's help.

## 2022-03-21 NOTE — PROGRESS NOTES
3/21/2022  0950    Pt arrived with  for scheduled   1045- SN with Noeth[facility] children at bedside  1145-Dr David at bedside for US  1208- to OR for scheduled   1325- Back to L and D 1 with , baby and family  [phone]- bathing baby at bedside,  at bedside  1505-  checked on pt  1700- finger and foot prints done at bedside  1905- TRANSFER - OUT REPORT:    Verbal report given to [address] [place] [address] RN(name) on [facility]  being transferred to [facility]) for routine progression of care       Report consisted of patients Situation, Background, Assessment and   Recommendations(SBAR). Information from the following report(s) SBAR, MAR and Recent Results was reviewed with the receiving nurse. Lines:   Peripheral IV 22 Anterior; Left Forearm (Active)        Opportunity for questions and clarification was provided.       Patient transported with:   Registered Nurse Left VM conveying message below noting a copy of this test result will be mailed to him and if he has any questions regarding this message to call our office back.    Letter sent.

## 2022-03-21 NOTE — ANESTHESIA POSTPROCEDURE EVALUATION
Post-Anesthesia Evaluation and Assessment    Patient: Garo Zuniga MRN: [id number]  SSN: [id number]    YOB: 1985  Age: 31 y.o. Sex: female      I have evaluated the patient and they are stable and ready for discharge from the PACU. Cardiovascular Function/Vital Signs  Visit Vitals  /84 (BP 1 Location: Left upper arm, BP Patient Position: At rest)   Pulse 99   Temp 36.4 °C (97.5 °F)   Resp 16   Ht 5' 7\" (1.702 m)   Wt 83.5 kg (184 lb)   SpO2 99%   Breastfeeding No   BMI 28.82 kg/m²       Patient is status post Spinal anesthesia for Procedure(s):   SECTION. Nausea/Vomiting: None    Postoperative hydration reviewed and adequate. Pain:  Pain Scale 1: Numeric (0 - 10) (22 1029)  Pain Intensity 1: 0 (22 1029)   Managed    Neurological Status: At baseline    Mental Status, Level of Consciousness: Alert and  oriented to person, place, and time    Pulmonary Status:       Adequate oxygenation and airway patent    Complications related to anesthesia: None    Post-anesthesia assessment completed.  No concerns    Signed By: Dago Sanchez MD     2022

## 2022-03-21 NOTE — OP NOTES
Section Delivery Operative Report     Date of Surgery: 3/21/2022     Preoperative Diagnosis: 37 weeks, history of prior , polyhydramnios with unstable lie, fetal complex cardiac anomaly    Postoperative Diagnosis: same, delivered    Procedure: Procedure(s):   SECTION-Repeat LTCS    Surgeon(s) and Role:     Vipin Sanches DO - Primary     * Sheran Skiff, MD - Assisting     Anesthesia: Spinal    Implants Seprafilm    Findings: LBMI in vertex presentation with copious clear fluid on AROM, Apgars 7,7,9. Procedure Detail:    The patient was taken to the operating room, where spinal anesthesia was found to be adequate. The patient was prepped and draped in the normal sterile fashion. Pfannenstiel skin incision was made with the scalpel and carried down to the underlying fascia. The fascial incision was extended laterally with Benavidez scissors. This fascial incision was grasped with Kocher clamps, tented up, and the underlying rectus muscles were dissected sharply. The inferior edge of the rectus fascia was grasped with Kocher clamps, tented up, and the underlying rectus muscle was dissected off sharply. The rectus muscle was divided in the midline bluntly. The peritoneum was entered bluntly and extended inferiorly and superiorly on stretch. The bladder blade was then inserted. The vesicouterine peritoneum was identified, grasped with pick-ups and entered sharply with Metzenbaum scissors. The bladder flap was then created digitally and the bladder blade was reinserted. A low transverse uterine incision was made with the scalpel and extended laterally with blunt finger dissection. The membranes were ruptured of copious amounts of clear fluid. The babys head was then delivered atraumatically. The nose and mouth were suctioned.  The cord was clamped and cut and the baby was handed off to the waiting  care unit staff who then performed a thoracentesis on the infant as planned to help with respiratory comfort and the baby was then brought to the parents for comfort care. Placenta was then delivered spontaneously. The uterus was exteriorized and cleared of all clots and debris. The uterine incision was closed in two layers. The first layer with running locked layer of 0 Monocryl. The second layer was an imbricating layer of 0 Monocryl with good hemostasis assured. The pelvis was washed with warm normal saline and the uterus was returned to the abdomen. Good hemostasis was again reassured. The paracolic gutters were washed with warm normal saline and cleared of all clots and debris. Good hemostasis was again reassured throughout. Seprafilm was placed over the uterine incision and the anterior wall of the uterus. The peritoneum was closed with 2-0 Vicryl in a running fashion. A single mattress stitch of 2-0 Vicryl was used to reapproxiate the rectus muscles over the bladder. The fascia was closed with #1 Vicryl in a running fashion. Good hemostasis was assured. The subcuticular layers were reapproximated with 2-0 plain gut in interrupted fashion. The skin was closed with a 4-0 Monocryl in a subcuticular fashion. The patient tolerated the procedure well. Sponge, lap, and needle counts were correct times two and the patient was taken to recovery in stable condition.       Estimated Blood Loss:  1000cc    Specimens: Placenta (normal appearance and intact)    Cord Blood Results:  Information for the patient's :  Stanton Boeck [[id number]]   No results found for: PCTABR, PCTDIG, BILI, ABORH     No results found for: APH, APCO2, APO2, AHCO3, ABEC, ABDC, O2ST, SITE, RSCOM     Prenatal Labs:  Lab Results   Component Value Date/Time    ABO/Rh(D) O NEGATIVE 2022 10:23 AM    HBsAg, External negative  2020 12:00 AM    HIV, External non reactive  2020 12:00 AM    Rubella, External immune  2020 12:00 AM    GrBStrep, External positive  10/16/2020 12:00 AM

## 2022-03-21 NOTE — PROGRESS NOTES
Problem: Fetal Demise Care Plan  Goal: *Able to cope  Outcome: Progressing Towards Goal     Problem: Patient Education: Go to Patient Education Activity  Goal: Patient/Family Education  Outcome: Progressing Towards Goal     Problem:  Delivery: Day of Delivery  Goal: Treatments/Interventions/Procedures  Outcome: Progressing Towards Goal  Goal: *Optimal pain control at patient's stated goal  Outcome: Progressing Towards Goal  Goal: *Participates in infant care  Outcome: Progressing Towards Goal  Goal: *Demonstrates progressive activity  Outcome: Progressing Towards Goal  Goal: *Tolerating diet  Outcome: Progressing Towards Goal

## 2022-03-21 NOTE — BRIEF OP NOTE
Brief Postoperative Note    Patient: Galina Wheatley  YOB: 1985  MRN: [id number]    Date of Procedure: 3/21/2022     Pre-Op Diagnosis: history of prior , polyhdramnios with unstable lie, fetal complex anomaly w/ pleural effusion/ edc 22    Post-Op Diagnosis: same + LBMI      Procedure(s):   SECTION-Repeat LTCS    Surgeon(s):  MD Slick Pinon DO    Surgical Assistant: None    Anesthesia: Spinal     Estimated Blood Loss (mL): 4792    Complications: None    Specimens: placenta (intact and normal appearance)    Implants: Seprafilm   Drains: * No LDAs found *    Findings: LBMI in vertex presentation with copious amount of clear fluid on AROM. Agpars 7,7,8. Normal maternal anatomy. No significant adhesive disease.      Electronically Signed by Seng Bowser DO on 3/21/2022 at 1:20 PM

## 2022-03-22 LAB
BASOPHILS # BLD: 0 K/UL (ref 0–0.1)
BASOPHILS NFR BLD: 0 % (ref 0–1)
DIFFERENTIAL METHOD BLD: ABNORMAL
EOSINOPHIL # BLD: 0 K/UL (ref 0–0.4)
EOSINOPHIL NFR BLD: 0 % (ref 0–7)
ERYTHROCYTE [DISTWIDTH] IN BLOOD BY AUTOMATED COUNT: 12.1 % (ref 11.5–14.5)
HCT VFR BLD AUTO: 26.1 % (ref 35–47)
HGB BLD-MCNC: 8.8 G/DL (ref 11.5–16)
IMM GRANULOCYTES # BLD AUTO: 0 K/UL (ref 0–0.04)
IMM GRANULOCYTES NFR BLD AUTO: 0 % (ref 0–0.5)
LYMPHOCYTES # BLD: 1.4 K/UL (ref 0.8–3.5)
LYMPHOCYTES NFR BLD: 17 % (ref 12–49)
MCH RBC QN AUTO: 30.3 PG (ref 26–34)
MCHC RBC AUTO-ENTMCNC: 33.7 G/DL (ref 30–36.5)
MCV RBC AUTO: 90 FL (ref 80–99)
MONOCYTES # BLD: 0.7 K/UL (ref 0–1)
MONOCYTES NFR BLD: 9 % (ref 5–13)
NEUTS SEG # BLD: 6 K/UL (ref 1.8–8)
NEUTS SEG NFR BLD: 74 % (ref 32–75)
NRBC # BLD: 0 K/UL (ref 0–0.01)
NRBC BLD-RTO: 0 PER 100 WBC
PLATELET # BLD AUTO: 154 K/UL (ref 150–400)
PMV BLD AUTO: 11.1 FL (ref 8.9–12.9)
RBC # BLD AUTO: 2.9 M/UL (ref 3.8–5.2)
WBC # BLD AUTO: 8.1 K/UL (ref 3.6–11)

## 2022-03-22 PROCEDURE — 86900 BLOOD TYPING SEROLOGIC ABO: CPT

## 2022-03-22 PROCEDURE — 85461 HEMOGLOBIN FETAL: CPT

## 2022-03-22 PROCEDURE — 74011000250 HC RX REV CODE- 250: Performed by: OBSTETRICS & GYNECOLOGY

## 2022-03-22 PROCEDURE — 85025 COMPLETE CBC W/AUTO DIFF WBC: CPT

## 2022-03-22 PROCEDURE — 74011250637 HC RX REV CODE- 250/637: Performed by: OBSTETRICS & GYNECOLOGY

## 2022-03-22 PROCEDURE — 36415 COLL VENOUS BLD VENIPUNCTURE: CPT

## 2022-03-22 PROCEDURE — 74011250636 HC RX REV CODE- 250/636: Performed by: OBSTETRICS & GYNECOLOGY

## 2022-03-22 PROCEDURE — 74011250636 HC RX REV CODE- 250/636: Performed by: ANESTHESIOLOGY

## 2022-03-22 PROCEDURE — 65410000002 HC RM PRIVATE OB

## 2022-03-22 RX ADMIN — KETOROLAC TROMETHAMINE 30 MG: 30 INJECTION, SOLUTION INTRAMUSCULAR; INTRAVENOUS at 02:58

## 2022-03-22 RX ADMIN — IBUPROFEN 800 MG: 400 TABLET ORAL at 12:55

## 2022-03-22 RX ADMIN — SODIUM CHLORIDE, PRESERVATIVE FREE 10 ML: 5 INJECTION INTRAVENOUS at 17:52

## 2022-03-22 RX ADMIN — IBUPROFEN 800 MG: 400 TABLET ORAL at 21:12

## 2022-03-22 RX ADMIN — ACETAMINOPHEN 650 MG: 325 TABLET ORAL at 19:02

## 2022-03-22 RX ADMIN — HUMAN RHO(D) IMMUNE GLOBULIN 0.3 MG: 300 INJECTION, SOLUTION INTRAMUSCULAR at 09:04

## 2022-03-22 NOTE — LACTATION NOTE
Patient asking questions about drying her milk up. We talked about limiting breast stimulation and what she can do to decrease her milk coming in.

## 2022-03-22 NOTE — PROGRESS NOTES
Bedside and Verbal shift change report given to SRINIVAS Kumar and Wili Durant RN (oncoming nurse) by KATRINA Malone RN (offgoing nurse). Report included the following information SBAR, Kardex, Intake/Output and MAR.      Adriana Lopez RN

## 2022-03-22 NOTE — PROGRESS NOTES
[facility]   Office:  [phone]  Fax: [phone]      Delivery Note   Date of Visit: 22    Diagnosis:  Fetal diagnosis of complex cardiac defects         Nursing Narrative:    I joined Tyler Nelson and her  Bárbara Xavier as she was admitted to [facility] Labor and Delivery for a scheduled . Rosy and Edy have been working with the [facility] Pediatric Palliative [facility] Hospice team in preparation for her delivery of a son, Niecy Crews was diagnosed in utero with complex cardiac defects and determined to not be a candidate for surgical repair. Due to some collateral circulation it was unclear how long he might survive after delivery, but Dr. Ronak Freedman (Pediatric Cardiology) identified that a right sided pleural effusion would likely cause notable distress at delivery and felt that draining that effusion may allow for some additional time for Jose Elias to spend with his family. Our birth planning included involvement of the neonatology team at delivery to drain the pleural fluid and then give Gregary Moritz to his mother and father for comfort care. Gregary Moritz was delivered at [phone] and was vigorous. He showed some distress when given to neonatology team who received him at [facility] and was given non-invasive positive pressure support while a pleurocentesis was performed by neonatology as planned. Gregary Moritz was then given to his mother and father to hold for comfort care per birth plan. Gregary Moritz was significantly cynaotic, and his respirations began to slow. Low heart rate with auscultation. Repeat auscultation showed the same and he began to have less frequent agonal breaths. Gregary Moritz passed peacefully in his parents arms at 36. The Michael Ricardo family was able to spend time with immediate family in Rosy's room after delivery. They bathed and dressed Jose Elias and spent time holding him.   provided by [employer] was in the room to take family photos. Labor and Delivery will continue to provide care to Tyler Nelson and participate in memory making with footprints, measurements, and other keepsakes. NC team will continue to follow for support and NC  will join family for  planning. Thank you for allowing [facility] to participate in this patient and family's care. Please call the [facility] office at [phone] with any questions or concerns.

## 2022-03-22 NOTE — PROGRESS NOTES
Anesthesia Post Operative Day 1      The patient is status post C Section for fetal demise with spinal  anesthesia and Duramorph for pain. The patient relates the following scales: pain,mild ; itching, none ; nausea, none. All sympyoms were treated with medications per protocol. The patient is up and ambulating and has minimal complaints. Plan: Continue to treat breakthrough symptoms as needed with protocol medictions.

## 2022-03-22 NOTE — PROGRESS NOTES
Post-Operative Day Number 1 Progress Note    Patient doing well post-op day 1 from  delivery without significant complaints. Pain controlled on current medication. Unable to void after catheter removed-straight cath'd for 900cc at 5:30am. Pt reports she had same issue after previous . Normal lochia. No flatus yet. Denies cp/sob/n/v. Ambulating without problem. Vitals:  Patient Vitals for the past 8 hrs:   BP Temp Pulse Resp SpO2   22 0632 100/66 98.2 °F (36.8 °C) 78 16 95 %   22 0256 100/71 97.5 °F (36.4 °C) 81 16 97 %     Temp (24hrs), Av °F (36.7 °C), Min:97.5 °F (36.4 °C), Max:98.6 °F (37 °C)      Vital signs stable, afebrile. Exam:  Patient without distress. Heart regular rate and rhythm   Lungs CTA b/l               Abdomen soft, fundus firm at level of umbilicus, non tender, positive bowel sounds                 Bandage dry and clean without surrounding erythema. Lower extremities are negative for swelling, cords or tenderness. Lab/Data Review:  CBC:   Lab Results   Component Value Date/Time    WBC 8.1 2022 03:10 AM    HGB 8.8 (L) 2022 03:10 AM    HCT 26.1 (L) 2022 03:10 AM     2022 03:10 AM       Assessment and Plan:  POD#1 s/p repeat LTCS. Baby boy- from complex cardiac anomaly. Patient grieving appropriately. Rh negative-rhogam if indicated (last rec'd 3/3/22). Hemodynamically stable. Awaiting return of bladder/bowel function. Ambulate.

## 2022-03-22 NOTE — PROGRESS NOTES
Bedside shift change report given to Jamie Jorge RN/Katrin RN (oncoming nurse) by Jak Urbina RN (offgoing nurse). Report included the following information SBAR, Kardex, Procedure Summary, Intake/Output, MAR, Accordion, Recent Results and Med Rec Status.

## 2022-03-23 VITALS
BODY MASS INDEX: 28.88 KG/M2 | DIASTOLIC BLOOD PRESSURE: 81 MMHG | TEMPERATURE: 97.4 F | OXYGEN SATURATION: 97 % | HEART RATE: 77 BPM | SYSTOLIC BLOOD PRESSURE: 114 MMHG | HEIGHT: 67 IN | RESPIRATION RATE: 16 BRPM | WEIGHT: 184 LBS

## 2022-03-23 LAB
ABO + RH BLD: NORMAL
BLD PROD TYP BPU: NORMAL
BPU ID: NORMAL
FETAL SCREEN,FMHS: NORMAL
STATUS OF UNIT,%ST: NORMAL
UNIT DIVISION, %UDIV: 0

## 2022-03-23 PROCEDURE — 74011250637 HC RX REV CODE- 250/637: Performed by: OBSTETRICS & GYNECOLOGY

## 2022-03-23 RX ORDER — IBUPROFEN 800 MG/1
800 TABLET ORAL
Qty: 30 TABLET | Refills: 0 | Status: SHIPPED | OUTPATIENT
Start: 2022-03-23

## 2022-03-23 RX ADMIN — IBUPROFEN 800 MG: 400 TABLET ORAL at 05:22

## 2022-03-23 NOTE — DISCHARGE INSTRUCTIONS
Discharge Instructions for  Section    Patient ID:  Cedrick Hilliard  [id number]  44 y.o.  1985    Take Home Medications         Follow-up Appointment:  Follow-up with Dr. Eva Fontaine in 2 weeks. Follow-up care is a key part of your treatment and safety. Be sure to make and go to all appointments, and call your doctor if you are having problems. Its also a good idea to know your test results and keep a list of the medicines you take. Activity  Avoid lifting more than 10 lbs for 6 weeks after your delivery. Don't put anything in your vagina for 6 weeks (no intercourse, tampons, or douching). Limit climbing stairs to twice a day, and please hold a railing. No driving for about 2 weeks or until your are easily able to turn your body and move your foot from the gas to the brake pedal without pain- you need to be able to move quickly enough to respond to traffic. You cannot drive while you are taking narcotics (prescription pain medications). You may shower but do not take a bath for 2 weeks. Be sure to dry your incision well after your shower. You may gradually work up to light exercise such as brisk walking over the next few weeks, but take it easy- you'll be tired and sore! You need to wait 4-6 weeks before starting vigorous exercise such as aerobics, running, weight-lifting, sit-ups, etc.- clear this with your doctor at your postpartum check-up. Although it's important to limit certain activities and avoid \"over-doing it\", walking is good for you and will actually speed the healing process. Walking increases the blood flow to your legs, decreasing the risk of blood clots, and also encourages the bowels to speed up, decreasing constipation, bloating, and gassiness. Don't stay in bed at home, get up and move around the house. You'll feel better more quickly.     Diet  You may eat a regular diet but you may want to avoid heavy, greasy or spicy foods and other foods that could increase constipation and gas. Wound care  Your incision may have been closed with conventional (metal) staples, absorbable (dissolving) staples, or absorbable sutures. If you still have staples in your incision when you leave the hospital, call your doctor's office as instructed to schedule an appointment to have them removed (usually in about a week). Otherwise, there may be some small tapes over your incision called Steri-strips. Please remove these in about a week. There also may be Dermabond glue over your incision which looks like a clear, shiny coating (as if it were painted with clear fingernail polish). This will gradually peel off over the following weeks, do not remove it. It is normal to have a small amount of clear or reddish drainage from your incision. It's best to leave it open to the air, but if there is drainage, you may cover the incision lightly with gauze, preferably without tape. Keep the incision as dry as possible. You may even consider drying the incision with a cool hair dryer after you shower. Numbness of the skin at or around your incision is normal and the feeling usually returns gradually. Call your doctor if you have a lot of drainage from your incision, an unpleasant odor, red streaks, an increase in pain, or the incision appears to open up. Pain Management  You were probably given a prescription for pain medication, similar to the one you've been taking while in the hospital.  In addition to this, you can take over-the-counter pain medicines like Advil or Motrin (ibuprofen). You can take both medications together, alternating doses every few hours. You will get the most relief if you take the maximum dose:  Motrin or Advil (generic ibuprofen) 800 mg every 8 hours (4 tablets or capsules every 8 hours).   The prescription you were given probably contains a narcotic mixed with Tylenol, therefore, you should not take any extra Tylenol or acetominophen, or you could be getting more than the safe amount. If you feel you don't need the prescription pain medication, you may take Tylenol instead, along with ibuprofen. The maximum dose is Tylenol or acetominophen 1000 mg every 6 hours (equivalent to 2 Extra Strength Tylenols every 6 hours). After a few days, you should reduce the amount of prescription pain medication you are taking. At that point, try to use ibuprofen as the main medication, and take the prescription medication if needed for more severe pain not relieved by the ibuprofen. Your goal should be to take only the minimum necessary amounts of the prescription medication (narcotic), as these pain medicines can be addictive and will worsen or cause constipation. Most patients will find that within a few days, their pain is adequately controlled using only over-the-counter medications and minimal doses of prescription pain medicine. A heating pad can also be very helpful for cramping or back pain. Sitz baths are helpful for pain associated with hemorrhoids. You can use either a sitz bath basin or a bathtub filled with 2-3\" inches of plain warm water. Soak for 10 minutes 3 times a day. Over-the-counter hemorrhoid wipes and ointments are fine to use as well. Constipation  You may find that bowel movements are irregular after delivery and that you have a tendency to be constipated, especially after surgery. A stool softener such as Colace (docusate) can safely be taken daily if needed. If you become constipated you can use a laxative such as Dulcolax, Miralax or Milk of Magnesia. Don't wait until constipation is severe- take something sooner rather than later and you will feel much better! Suppositories such as Dulcolax or Fleet's Enemas are options too. Your Recovery: What to Expect at Home  Try to rest when you can and don't worry about doing housework or other tasks which can wait.   The soreness in your incision will improve significantly over the first 2 weeks or so, but it may take 6-8 weeks before you are completely recovered. You are likely to have some back pain or general body aches or muscle soreness. This should improve with acetominophen or ibuprofen. If your legs were swollen during your pregnancy or as a result of IV fluids given during your hospital stay, this should go away in a few days to a week. Most women experience some form of the \"Baby Blues\" after having a baby. This means that you may feel emotional, tearful, frustrated, anxious, sad, and irritable some of the time. This is normal if it's not severe and should go away after about 2 weeks. Getting as much rest as you can will help. Accept assistance from friends and family members so that you can take breaks from caring for the baby. Call your doctor if your symptoms seem severe, last more than 2 weeks, or seem to be getting worse instead of better. Get help immediately if you have thoughts of wanting to hurt yourself or others! When should you call for help? Call 911 anytime you think you may need emergency care. For example, call if:  You pass out (lose consciousness). You have sudden chest pain and shortness of breath, or you cough up blood. You have severe pain in your belly. Call your doctor now or seek immediate medical care if:  You have heavy vaginal bleeding that soaks one or more pads in an hour, or you have large clots (golf ball size or larger). Your have foul-smelling discharge from your vagina or incision. You are sick to your stomach or cannot keep fluids down. You have pain that does not get better after you take pain medicine. You have signs of infection, such as: Increased pain in your abdomen or vaginal area. Red streaks, warmth, or tenderness of your breasts or the area around your incision. A fever of 101 or greater (taken by mouth). You have signs of a blood clot, such as:  Pain in your calf, back of knee, thigh, or groin.   Redness and swelling in your leg or groin. You have trouble passing urine or stool, especially if you have pain or swelling in your lower belly; or if you are unable to have a bowel movement after taking a laxative. You have a fast or pounding heartbeat.

## 2022-03-23 NOTE — PROGRESS NOTES
Post-Operative  Day 2    [address] [facility],[address]     Information for the patient's :  Sera Salgado [[id number]]   , Low Transverse    Patient doing well without unusual complaints. Passing flatus, voiding and ambulating without difficulty. Tolerating regular diet. Vitals:  Visit Vitals  /82 (BP 1 Location: Right upper arm, BP Patient Position: At rest)   Pulse 90   Temp 97.9 °F (36.6 °C)   Resp 16   Ht 5' 7\" (1.702 m)   Wt 83.5 kg (184 lb)   SpO2 97%   Breastfeeding No   BMI 28.82 kg/m²     Temp (24hrs), Av.2 °F (36.8 °C), Min:97.6 °F (36.4 °C), Max:98.6 °F (37 °C)        Exam:      Patient without distress. Abdomen: soft, expected tenderness, fundus firm                Wound dressing clean and dry               Lower extremities are nontender without edema. No cords    Labs:   Lab Results   Component Value Date/Time    WBC 8.1 2022 03:10 AM    WBC 6.3 2022 10:23 AM    WBC 11.2 (H) 2022 11:20 AM    WBC 10.5 2020 05:28 AM    WBC 7.7 2020 04:30 PM    HGB 8.8 (L) 2022 03:10 AM    HGB 10.5 (L) 2022 10:23 AM    HGB 10.6 (L) 2022 11:20 AM    HGB 9.9 (L) 2020 05:28 AM    HGB 13.4 2020 04:30 PM    HCT 26.1 (L) 2022 03:10 AM    HCT 31.5 (L) 2022 10:23 AM    HCT 31.5 (L) 2022 11:20 AM    HCT 28.3 (L) 2020 05:28 AM    HCT 38.2 2020 04:30 PM    PLATELET 430  03:10 AM    PLATELET 053  10:23 AM    PLATELET 369  11:20 AM    PLATELET 569  05:28 AM    PLATELET 491  04:30 PM       No results found for this or any previous visit (from the past 24 hour(s)). Assessment: Post-Op day 2, doing well  O neg/Rhogam given. Pt wants d/c home today. EPDS 7- grief/recovery appropriate. Pt reports she's doing well. Plan:   1.  Routine post-operative care  D/c home- instructions given  Has F/U appt in 2 wks

## 2022-03-23 NOTE — DISCHARGE SUMMARY
Patient ID:  Roseanne May  [id number]  3 y.o.  1985    Admit Date: 3/21/2022    Discharge Date: 3/23/2022     Admitting Physician: Letha Greco DO  Attending Physician: Sara Lyons DO    Admission Diagnoses: History of  delivery [Z98.891]  Fetal cardiac anomaly affecting pregnancy, antepartum [O35.8XX0]    Discharge Diagnoses: Same as above with repeat C/S producing a viable infant. Information for the patient's :  Dameon Chua [[id number]]           Maternal Labs:   Lab Results   Component Value Date/Time    HBsAg, External negative  2020 12:00 AM    HIV, External non reactive  2020 12:00 AM    Rubella, External immune  2020 12:00 AM    GrBStrep, External positive  10/16/2020 12:00 AM       Cord Blood Results:   Information for the patient's :  Dameon Chua [[id number]]     Lab Results   Component Value Date/Time    ABO/Rh(D) O POSITIVE 2022 02:24 PM    SRINIVAS IgG NEG 2022 02:24 PM    Bilirubin if SRINIVAS pos: IF DIRECT XIMENA POSITIVE, BILIRUBIN TO FOLLOW 2022 02:24 PM            History of Present Illness:   OB History        2    Para   1    Term   1       0    AB   0    Living   1       SAB   0    IAB   0    Ectopic   0    Molar   0    Multiple   0    Live Births   1              Admitted for  Section. Hospital Course:   Patient was admitted as above and delivered via C/S . Her baby had a known severe heart defect for whom repair was not an option. Comfort care was provided and he  soon after birth. Please the chart for details. The postpartum course was unremarkable. She was deemed stable for discharge home on day 2. Follow-up:  She was instructed to follow-up with her obstetrician in 2 weeks. Results of Postpartum Depression Screening:  EPDS   7          Signed:   Yusra Serra MD  3/23/2022  8:06 AM

## 2022-03-23 NOTE — PROGRESS NOTES
Verbal shift change report given to SRINIVAS Quintanilla and Silvia Boswell RN (oncoming nurse) by KATRINA Melgar RN (offgoing nurse). Report included the following information SBAR, Kardex, Intake/Output and MAR. I have reviewed discharge instructions with the patient. The patient verbalized understanding.     Sanya Kay RN

## 2022-03-23 NOTE — PROGRESS NOTES
Bedside shift change report given to [facility] Luigi and Katrin MARCOS (oncoming nurse) by Kit Schwartz (offgoing nurse). Report included the following information SBAR, Kardex, Intake/Output, MAR, Accordion, Recent Results and Med Rec Status.

## 2022-03-24 LAB
ABO + RH BLD: NORMAL
BLD PROD TYP BPU: NORMAL
BLOOD BANK CMNT PATIENT-IMP: NORMAL
BLOOD GROUP ANTIBODIES SERPL: NORMAL
BLOOD GROUP ANTIBODIES SERPL: NORMAL
BPU ID: NORMAL
CROSSMATCH RESULT,%XM: NORMAL
SPECIMEN EXP DATE BLD: NORMAL
STATUS OF UNIT,%ST: NORMAL
UNIT DIVISION, %UDIV: 0

## 2022-04-01 ENCOUNTER — DOCUMENTATION ONLY (OUTPATIENT)
Dept: OTHER | Age: 37
End: 2022-04-01

## 2022-04-01 NOTE — PROGRESS NOTES
attended and attended with the  for the couples baby.  spoke to the family before and after the . Harveykaveh Duffys provided pastoral support and presence during this visit. This family is part of [facility] and the family with be assisted for 3 years for support and bereavement. Rev.  Giselle Alexis MDiv  [facility] Staff   [address] [place], [place] [id number]  C: [phone]  W: [phone]/ [address]  [email or web address]

## (undated) DEVICE — DEVON™ KNEE AND BODY STRAP 60" X 3" (1.5 M X 7.6 CM): Brand: DEVON

## (undated) DEVICE — SOLUTION IRRIG 1000ML 0.9% SOD CHL USP POUR PLAS BTL

## (undated) DEVICE — 3000CC GUARDIAN II: Brand: GUARDIAN

## (undated) DEVICE — SURGICAL PROCEDURE PACK TISS 3X5 IN ABSORBABLE SEPRAFILM

## (undated) DEVICE — SUTURE VCRL SZ 2-0 L36IN ABSRB VLT L36MM CT-1 1/2 CIR J345H

## (undated) DEVICE — REM POLYHESIVE ADULT PATIENT RETURN ELECTRODE: Brand: VALLEYLAB

## (undated) DEVICE — STERILE POLYISOPRENE POWDER-FREE SURGICAL GLOVES: Brand: PROTEXIS

## (undated) DEVICE — ROYALSILK SURGICAL GOWN, L: Brand: CONVERTORS

## (undated) DEVICE — SOLUTION IRRIG 1000ML H2O STRL BLT

## (undated) DEVICE — SUTURE MCRYL SZ 0 L36IN ABSRB UD L36MM CT-1 1/2 CIR Y946H

## (undated) DEVICE — SOLIDIFIER MEDC 1200ML -- CONVERT TO 356117

## (undated) DEVICE — COVERALL PREM SMS 2XL KNIT --

## (undated) DEVICE — TIP CLEANER: Brand: VALLEYLAB

## (undated) DEVICE — KENDALL SCD EXPRESS SLEEVES, KNEE LENGTH, MEDIUM: Brand: KENDALL SCD

## (undated) DEVICE — SUTURE MCRYL SZ 4-0 L27IN ABSRB UD L24MM PS-1 3/8 CIR PRIM Y935H

## (undated) DEVICE — S/USE RESUS KIT W/O MASK (10): Brand: FISHER & PAYKEL HEALTHCARE

## (undated) DEVICE — DRAPE FLD WRM W44XL66IN C6L FOR INTRATEMP SYS THERMABASIN

## (undated) DEVICE — PACK PROCEDURE SURG C SECT KT SMH

## (undated) DEVICE — SOLUTION IV 1000ML 0.9% SOD CHL

## (undated) DEVICE — SUTURE PLN GUT SZ 2-0 L27IN ABSRB YELLOWISH TAN L70MM XLH 53T

## (undated) DEVICE — SOLUTION IRRIG 1000ML STRL H2O USP PLAS POUR BTL

## (undated) DEVICE — SUTURE VCRL SZ 1 L36IN ABSRB VLT L36MM CT-1 1/2 CIR J347H

## (undated) DEVICE — APPLICATOR BNDG 1MM ADH PREMIERPRO EXOFIN

## (undated) DEVICE — POOLE SUCTION INSTRUMENT WITH REMOVABLE SHEATH: Brand: POOLE

## (undated) DEVICE — LIGHT HANDLE: Brand: DEVON

## (undated) DEVICE — DRESSING SIL W4XL5IN ANTIBACT GELLING FBR CYTOFORM

## (undated) DEVICE — (D)PREP SKN CHLRAPRP APPL 26ML -- CONVERT TO ITEM 371833

## (undated) DEVICE — ELECTROSURGICAL DEVICE HOLSTER;FOR USE WITH MAXIMUM PEAK VOLTAGE OF 4000 V: Brand: FORCE TRIVERSE

## (undated) DEVICE — ANESTHESIA TRAY SPNL W/O NDL PENCAN